# Patient Record
Sex: FEMALE | Race: BLACK OR AFRICAN AMERICAN | Employment: FULL TIME | URBAN - METROPOLITAN AREA
[De-identification: names, ages, dates, MRNs, and addresses within clinical notes are randomized per-mention and may not be internally consistent; named-entity substitution may affect disease eponyms.]

---

## 2018-10-01 PROBLEM — D50.9 MICROCYTIC ANEMIA: Status: ACTIVE | Noted: 2018-10-01

## 2018-10-01 PROBLEM — E87.6 HYPOKALEMIA: Status: ACTIVE | Noted: 2018-10-01

## 2018-10-03 ENCOUNTER — OFFICE VISIT (OUTPATIENT)
Dept: FAMILY MEDICINE CLINIC | Age: 38
End: 2018-10-03

## 2018-10-03 VITALS
OXYGEN SATURATION: 100 % | RESPIRATION RATE: 18 BRPM | HEIGHT: 63 IN | HEART RATE: 88 BPM | DIASTOLIC BLOOD PRESSURE: 97 MMHG | TEMPERATURE: 97.6 F | SYSTOLIC BLOOD PRESSURE: 147 MMHG | WEIGHT: 149 LBS | BODY MASS INDEX: 26.4 KG/M2

## 2018-10-03 DIAGNOSIS — D17.22 LIPOMA OF LEFT SHOULDER: ICD-10-CM

## 2018-10-03 DIAGNOSIS — Z20.2 EXPOSURE TO STD: ICD-10-CM

## 2018-10-03 DIAGNOSIS — N89.8 VAGINAL DISCHARGE: Primary | ICD-10-CM

## 2018-10-03 PROBLEM — D57.3 SICKLE-CELL TRAIT (HCC): Status: ACTIVE | Noted: 2018-06-19

## 2018-10-03 PROBLEM — Z17.0 MALIGNANT NEOPLASM OF UPPER-OUTER QUADRANT OF RIGHT BREAST IN FEMALE, ESTROGEN RECEPTOR POSITIVE (HCC): Status: ACTIVE | Noted: 2018-06-06

## 2018-10-03 PROBLEM — C50.411 MALIGNANT NEOPLASM OF UPPER-OUTER QUADRANT OF RIGHT BREAST IN FEMALE, ESTROGEN RECEPTOR POSITIVE (HCC): Status: ACTIVE | Noted: 2018-06-06

## 2018-10-03 LAB
BILIRUB UR QL STRIP: NEGATIVE
GLUCOSE UR-MCNC: NEGATIVE MG/DL
KETONES P FAST UR STRIP-MCNC: NEGATIVE MG/DL
PH UR STRIP: 7.5 [PH] (ref 4.6–8)
PROT UR QL STRIP: NORMAL
SP GR UR STRIP: 1.02 (ref 1–1.03)
UA UROBILINOGEN AMB POC: NORMAL (ref 0.2–1)
URINALYSIS CLARITY POC: CLEAR
URINALYSIS COLOR POC: YELLOW
URINE BLOOD POC: NORMAL
URINE LEUKOCYTES POC: NEGATIVE
URINE NITRITES POC: NEGATIVE

## 2018-10-03 RX ORDER — ONDANSETRON 8 MG/1
TABLET, ORALLY DISINTEGRATING ORAL
Refills: 3 | COMMUNITY
Start: 2018-08-17 | End: 2020-01-27 | Stop reason: ALTCHOICE

## 2018-10-03 RX ORDER — DEXAMETHASONE 4 MG/1
TABLET ORAL
COMMUNITY
Start: 2018-10-02 | End: 2018-11-12 | Stop reason: ALTCHOICE

## 2018-10-03 NOTE — MR AVS SNAPSHOT
[address]  [address] [facility] [address] [phone] Patient: Anny Patrick MRN: [id number] Visit Information Date & Time Provider Department Dept. Phone Encounter #  
 10/3/2018  9:00 AM Evangelina Meckel, [employer] [phone] [id number] Follow-up Instructions Return if symptoms worsen or fail to improve. Upcoming Health Maintenance Date Due DTaP/Tdap/Td series (1 - Tdap) 7/5/2003 PAP AKA CERVICAL CYTOLOGY 7/5/2003 Influenza Age 5 to Adult 8/1/2018 Allergies as of 10/3/2018  Review Complete On: 10/3/2018 By: Evangelina Meckel, NP No Known Allergies Current Immunizations  Never Reviewed No immunizations on file. Not reviewed this visit You Were Diagnosed With   
  
 Codes Comments Vaginal discharge    -  Primary ICD-10-CM: N89.8 ICD-9-CM: 623.5 Lipoma of left shoulder     ICD-10-CM: D17.22 
ICD-9-CM: 214.8 Exposure to STD     ICD-10-CM: Z20.2 ICD-9-CM: V01.6 Vitals BP Pulse Temp Resp Height(growth percentile) Weight(growth percentile) (!) 147/97 (BP 1 Location: Right arm, BP Patient Position: Sitting) 88 97.6 °F (36.4 °C) (Oral) 18 5' 3\" (1.6 m) 149 lb (67.6 kg) LMP SpO2 BMI OB Status Smoking Status 08/20/2018 100% 26.39 kg/m2 Unknown Never Smoker BMI and BSA Data Body Mass Index Body Surface Area  
 26.39 kg/m 2 1.73 m 2 Preferred Pharmacy Pharmacy Name Phone [facility] [address] [phone] Your Updated Medication List  
  
   
This list is accurate as of 10/3/18  9:49 AM.  Always use your most recent med list.  
  
  
  
  
 CARBOPLATIN IV  
by IntraVENous route. dexamethasone 4 mg tablet Commonly known as:  DECADRON  
  
 DOCETAXEL IV  
by IntraVENous route. ondansetron 8 mg disintegrating tablet Commonly known as:  ZOFRAN ODT  
DISSOLVE 1 TABLET ORALLY EVERY 8 HOURS AS NEEDED FOR NAUSEA. PERTUZUMAB IV  
by IntraVENous route. TRASTUZUMAB IV  
by IntraVENous route. We Performed the Following AMB POC URINALYSIS DIP STICK AUTO W/O MICRO [99098 CPT(R)] Follow-up Instructions Return if symptoms worsen or fail to improve. To-Do List   
 10/03/2018 Lab:  HIV 1/2 AG/AB, 4TH GENERATION,W RFLX CONFIRM   
  
 10/03/2018 Lab:  NUSWAB VAGINITIS PLUS   
  
 10/03/2018 Lab:  T PALLIDUM AB, BY FTA-ABS Patient Instructions Exposure to Sexually Transmitted Infections: Care Instructions Your Care Instructions Sexually transmitted infections (STIs) are those diseases spread by sexual contact. There are at least 20 different STIs, including chlamydia, gonorrhea, syphilis, and human immunodeficiency virus (HIV), which causes AIDS. Bacteria-caused STIs can be treated and cured. STIs caused by viruses, such as HIV, can be treated but not cured. Some STIs can reduce a woman's chances of getting pregnant in the future. STIs are spread during sexual contact, such as vaginal intercourse and oral or anal sex. Follow-up care is a key part of your treatment and safety. Be sure to make and go to all appointments, and call your doctor if you are having problems. It's also a good idea to know your test results and keep a list of the medicines you take. How can you care for yourself at home? · Your doctor may have given you a shot of antibiotics. If your doctor prescribed antibiotic pills, take them as directed. Do not stop taking them just because you feel better. You need to take the full course of antibiotics. · Do not have sexual contact while you have symptoms of an STI or are being treated for an STI. · Tell your sex partner (or partners) that he or she will need treatment. · If you are a woman, do not douche.  Douching changes the normal balance of bacteria in the vagina and may spread an infection up into your reproductive organs. To prevent exposure to STIs in the future · Use latex condoms every time you have sex. Use them from the beginning to the end of sexual contact. · Talk to your partner before you have sex. Find out if he or she has or is at risk for any STI. Keep in mind that a person may be able to spread an STI even if he or she does not have symptoms. · Do not have sex if you are being treated for an STI. · Do not have sex with anyone who has symptoms of an STI, such as sores on the genitals or mouth. · Having one sex partner (who does not have STIs and does not have sex with anyone else) is a good way to avoid STIs. When should you call for help? Call your doctor now or seek immediate medical care if: 
  · You have new pain in your belly or pelvis.  
  · You have symptoms of a urinary tract infection. These may include: 
¨ Pain or burning when you urinate. ¨ A frequent need to urinate without being able to pass much urine. ¨ Pain in the flank, which is just below the rib cage and above the waist on either side of the back. ¨ Blood in your urine. ¨ A fever.  
  · You have new or worsening pain or swelling in the scrotum.  
 Watch closely for changes in your health, and be sure to contact your doctor if: 
  · You have unusual vaginal bleeding.  
  · You have a discharge from the vagina or penis.  
  · You have any new symptoms, such as sores, bumps, rashes, blisters, or warts.  
  · You have itching, tingling, pain, or burning in the genital or anal area.  
  · You think you may have an STI. Where can you learn more? Go to http://nomi-dexter.info/. Enter O990 in the search box to learn more about \"Exposure to Sexually Transmitted Infections: Care Instructions. \" Current as of: November 27, 2017 Content Version: 11.7 © 2537-1869 [employer].  Care instructions adapted under license by [address] (which disclaims liability or warranty for this information). If you have questions about a medical condition or this instruction, always ask your healthcare professional. [address] any warranty or liability for your use of this information. Introducing [facility]! [facility] introduces MENABANQER patient portal. Now you can access parts of your medical record, email your doctor's office, and request medication refills online. 1. In your internet browser, go to [email or web address] 2. Click on the First Time User? Click Here link in the Sign In box. You will see the New Member Sign Up page. 3. Enter your MENABANQER Access Code exactly as it appears below. You will not need to use this code after youve completed the sign-up process. If you do not sign up before the expiration date, you must request a new code. · MENABANQER Access Code: [email or web address] Expires: 1/1/2019  9:49 AM 
 
4. Enter the last four digits of your Social Security Number (xxxx) and Date of Birth (mm/dd/yyyy) as indicated and click Submit. You will be taken to the next sign-up page. 5. Create a MENABANQER ID. This will be your MENABANQER login ID and cannot be changed, so think of one that is secure and easy to remember. 6. Create a MENABANQER password. You can change your password at any time. 7. Enter your Password Reset Question and Answer. This can be used at a later time if you forget your password. 8. Enter your e-mail address. You will receive e-mail notification when new information is available in [address] 9. Click Sign Up. You can now view and download portions of your medical record. 10. Click the Download Summary menu link to download a portable copy of your medical information. If you have questions, please visit the Frequently Asked Questions section of the MENABANQER website.  Remember, MENABANQER is NOT to be used for urgent needs. For medical emergencies, dial 911. Now available from your iPhone and Android! Please provide this summary of care documentation to your next provider. Your primary care clinician is listed as Felicia Dixon. If you have any questions after today's visit, please call [phone].

## 2018-10-03 NOTE — PROGRESS NOTES
Freedom Doty is a 41 y.o. female (: 1980) presenting to address:vaginal discharge x2 weeks     Chief Complaint   Patient presents with    Vaginal Discharge     x2 weeks        Vitals:    10/03/18 0907   BP: (!) 147/97   Pulse: 88   Resp: 18   Temp: 97.6 °F (36.4 °C)   TempSrc: Oral   SpO2: 100%   Weight: 149 lb (67.6 kg)   Height: 5' 3\" (1.6 m)   PainSc:   0 - No pain   LMP: 2018       Hearing/Vision:   No exam data present    Learning Assessment:   No flowsheet data found. Depression Screening:     PHQ over the last two weeks 10/3/2018   Little interest or pleasure in doing things Not at all   Feeling down, depressed, irritable, or hopeless More than half the days   Total Score PHQ 2 2     Fall Risk Assessment:   No flowsheet data found. Abuse Screening:   No flowsheet data found. Coordination of Care Questionaire:   1. Have you been to the ER, urgent care clinic since your last visit? Hospitalized since your last visit? no    2. Have you seen or consulted any other health care providers outside of the [facility] since your last visit? Include any pap smears or colon screening. Oncology Dr. Reyes Proper     Advanced Directive:   1. Do you have an Advanced Directive? no    2. Would you like information on Advanced Directives?  no

## 2018-10-03 NOTE — PROGRESS NOTES
Subjective:    Melanialarry [age] y.o. female complains of copious, foul and green vaginal discharge for 2 weeks. .  Denies abnormal vaginal bleeding or significant pelvic pain or  fever. No UTI symptoms. Denies history of known exposure to STD. Patient's last menstrual period was 08/20/2018. Other c/o lipoma left shoulder has been there for many years does not bother her. She would like to know if it can be removed at some point. Objective:   She appears well, afebrile. Abdomen: benign, soft, nontender, no masses. Pelvic Exam: normal external genitalia, vulva, vagina, cervix, uterus and adnexa. Urine dipstick:  positive for RBC's.  pending culture. For lipoma referral to surgeon when stable and healthy again if it is not bothersome no need to worry about it now. Assessment/Plan:   Pending labs and nuswab    ICD-10-CM ICD-9-CM    1. Vaginal discharge N89.8 623.5 AMB POC URINALYSIS DIP STICK AUTO W/O MICRO      NUSWAB VAGINITIS PLUS   2. Lipoma of left shoulder D17.22 214.8    3. Exposure to STD Z20.2 V01.6 T PALLIDUM AB, BY FTA-ABS      HIV 1/2 AG/AB, 4TH GENERATION,W RFLX CONFIRM      NUSWAB VAGINITIS PLUS       Orders Placed This Encounter    T PALLIDUM AB, BY FTA-ABS    HIV 1/2 AG/AB, 4TH GENERATION,W RFLX CONFIRM    NUSWAB VAGINITIS PLUS    AMB POC URINALYSIS DIP STICK AUTO W/O MICRO    dexamethasone (DECADRON) 4 mg tablet    ondansetron (ZOFRAN ODT) 8 mg disintegrating tablet    DOCETAXEL IV    CARBOPLATIN IV    TRASTUZUMAB IV    PERTUZUMAB IV   .

## 2018-10-03 NOTE — PATIENT INSTRUCTIONS
Exposure to Sexually Transmitted Infections: Care Instructions  Your Care Instructions  Sexually transmitted infections (STIs) are those diseases spread by sexual contact. There are at least 20 different STIs, including chlamydia, gonorrhea, syphilis, and human immunodeficiency virus (HIV), which causes AIDS. Bacteria-caused STIs can be treated and cured. STIs caused by viruses, such as HIV, can be treated but not cured. Some STIs can reduce a woman's chances of getting pregnant in the future. STIs are spread during sexual contact, such as vaginal intercourse and oral or anal sex. Follow-up care is a key part of your treatment and safety. Be sure to make and go to all appointments, and call your doctor if you are having problems. It's also a good idea to know your test results and keep a list of the medicines you take. How can you care for yourself at home? · Your doctor may have given you a shot of antibiotics. If your doctor prescribed antibiotic pills, take them as directed. Do not stop taking them just because you feel better. You need to take the full course of antibiotics. · Do not have sexual contact while you have symptoms of an STI or are being treated for an STI. · Tell your sex partner (or partners) that he or she will need treatment. · If you are a woman, do not douche. Douching changes the normal balance of bacteria in the vagina and may spread an infection up into your reproductive organs. To prevent exposure to STIs in the future  · Use latex condoms every time you have sex. Use them from the beginning to the end of sexual contact. · Talk to your partner before you have sex. Find out if he or she has or is at risk for any STI. Keep in mind that a person may be able to spread an STI even if he or she does not have symptoms. · Do not have sex if you are being treated for an STI. · Do not have sex with anyone who has symptoms of an STI, such as sores on the genitals or mouth.   · Having one sex partner (who does not have STIs and does not have sex with anyone else) is a good way to avoid STIs. When should you call for help? Call your doctor now or seek immediate medical care if:    · You have new pain in your belly or pelvis.     · You have symptoms of a urinary tract infection. These may include:  ¨ Pain or burning when you urinate. ¨ A frequent need to urinate without being able to pass much urine. ¨ Pain in the flank, which is just below the rib cage and above the waist on either side of the back. ¨ Blood in your urine. ¨ A fever.     · You have new or worsening pain or swelling in the scrotum.    Watch closely for changes in your health, and be sure to contact your doctor if:    · You have unusual vaginal bleeding.     · You have a discharge from the vagina or penis.     · You have any new symptoms, such as sores, bumps, rashes, blisters, or warts.     · You have itching, tingling, pain, or burning in the genital or anal area.     · You think you may have an STI. Where can you learn more? Go to http://nomi-dexter.info/. Enter D405 in the search box to learn more about \"Exposure to Sexually Transmitted Infections: Care Instructions. \"  Current as of: November 27, 2017  Content Version: 11.7  © 4243-4056 [employer]. Care instructions adapted under license by [employer] (which disclaims liability or warranty for this information). If you have questions about a medical condition or this instruction, always ask your healthcare professional. [place] [address] any warranty or liability for your use of this information.

## 2018-10-05 LAB
HIV 1+2 AB+HIV1 P24 AG SERPL QL IA: NON REACTIVE
T PALLIDUM AB SER QL IF: NON REACTIVE

## 2018-10-09 LAB
A VAGINAE DNA VAG QL NAA+PROBE: NORMAL SCORE
BACTERIA UR CULT: ABNORMAL
BVAB2 DNA VAG QL NAA+PROBE: NORMAL SCORE
C ALBICANS DNA VAG QL NAA+PROBE: NEGATIVE
C GLABRATA DNA VAG QL NAA+PROBE: NEGATIVE
C TRACH RRNA SPEC QL NAA+PROBE: NEGATIVE
MEGA1 DNA VAG QL NAA+PROBE: NORMAL SCORE
N GONORRHOEA RRNA SPEC QL NAA+PROBE: NEGATIVE
T VAGINALIS RRNA SPEC QL NAA+PROBE: NEGATIVE

## 2018-10-09 RX ORDER — METRONIDAZOLE 500 MG/1
500 TABLET ORAL 2 TIMES DAILY
Qty: 14 TAB | Refills: 0 | Status: SHIPPED | OUTPATIENT
Start: 2018-10-09 | End: 2018-10-16

## 2018-10-09 NOTE — PROGRESS NOTES
Please call the patient regarding her abnormal result. It does show abnormal for BV low grade start flagyl this is the medicine we talked about in the office that is ok. Attempted to contact patient. LM to CB.  Jannet Prabhakar

## 2018-10-10 NOTE — PROGRESS NOTES
Patient advised but stated that she would like to wait until after her next chemo tx to take (10/29) and wanted to know if this was ok? Patient also asking for results of lab results.  Jannet Prabhakar

## 2018-10-11 ENCOUNTER — TELEPHONE (OUTPATIENT)
Dept: FAMILY MEDICINE CLINIC | Age: 38
End: 2018-10-11

## 2018-10-15 ENCOUNTER — HOSPITAL ENCOUNTER (OUTPATIENT)
Dept: NUTRITION | Age: 38
Discharge: HOME | End: 2018-10-15
Payer: MEDICAID

## 2018-10-15 PROCEDURE — 97802 MEDICAL NUTRITION INDIV IN: CPT

## 2018-10-15 NOTE — PROGRESS NOTES
[address] [place] [address] 51, 45 [facility], [facility], [address] [facility]  Phone: [phone]  Fax: [phone]   Nutrition Assessment - Medical Nutrition Therapy   Outpatient Initial Evaluation         Patient Name: Concepcion Davis : 1980   Treatment Diagnosis: Breast CA   Referral Source: Jam Rivas MD Start of Care [facility]): 10/15/2018     Gender: female Age: 41 y.o. 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 She lives with her dad and stepmother. She is currently on chemo and she has 1 tx left (beginning Oct 29th). She has been eating healthier since living with dad, but she still has aversions to specific foods because she is scared to eat many things. Pt also suffers from anemia, decreased appetite, and IBD (chemo induced). At times, she cannot taste food. Her dad is encouraging her to utilize stationary bike for healthy lifestyle (diet + exercise). Current Eating Patterns: Pt lives with dad and stepmom. Dad mainly shops and cooks, pt cooks at times. Dad cooks 7 days per week.    B: 1-2 eggs, or [place] yogurt with wheat bread (ate wheat bread d/t constipation), Ensure, corn and wheat based cereals  L: Esnure, applesauce, canned peaches, bananas, maybe sandwich (tuna fish), cottage cheese  D: baked fish or chicken,        Estimate Needs   Calories:  - Protein: - Carbs: - Fat: -   Kcal/day  g/day  g/day  g/day        percent:                    Education & Recommendations provided: Educated pt on healthy diet for CA (things to avoid, ways to cook certain foods). Encouraged pt to eat small meals t/o the day so EEN is met or close to being met with decreased appetite. Developed a rough meal plan with patient and provided meal/snack ideas. Provided pt with information on estrogenic foods (pt request) and encouraged increased fruit/vegetable and water consumption. Recommended fiber supplement r/t c/o constipation. Handouts Provided: [x]  Carbohydrates  []  Protein  []  Fiber  []  Serving Sizes  []  Meal and Snack Ideas  []  Food Journals []  Diabetes  []  Cholesterol  []  Sodium  []  Gen Nutr Guidelines  []  SBGM Guidelines  [x]  Others: Snacks, rough meal plan (breakfast), and pamphlet on estrogenic foods   Information Reviewed with: Pt and father   Readiness to Change Stage: []  Pre-contemplative    []  Contemplative  []  Preparation               [x]  Action                  [x]  Maintenance   Potential Barriers to Learning: []  Decline in memory    []  Language barrier   []  Other:  []  Emotional                  []  Limited mobility  []  Lack of motivation     [] Vision, hearing or cognitive impairment   Expected Compliance: Good due to severity of dx. Nutritional Goal - To promote lifestyle changes to result in:    []  Weight loss  []  Improved diabetic control  []  Decreased cholesterol levels  []  Decreased blood pressure  []  Weight maintenance []  Preventing any interactions associated with food allergies  []  Adequate weight gain toward goal weight  [x]  Other: Eating healthy for CA support       Patient Goals:  SMART goals 1. At least 5 servings of fruits and vegetables per day  2. Look into yoga for beginners (to help with stress and digestion)  3.  No bottled water or soy products     Dietitian Signature: Koko Mark MS, RD Date: 10/15/2018   Follow-up: Wed 10/24/18 @ 2:30 Time: 10:13 AM

## 2018-10-19 ENCOUNTER — OFFICE VISIT (OUTPATIENT)
Dept: FAMILY MEDICINE CLINIC | Age: 38
End: 2018-10-19

## 2018-10-19 VITALS
SYSTOLIC BLOOD PRESSURE: 132 MMHG | RESPIRATION RATE: 18 BRPM | WEIGHT: 146 LBS | OXYGEN SATURATION: 100 % | HEIGHT: 63 IN | HEART RATE: 99 BPM | TEMPERATURE: 98 F | BODY MASS INDEX: 25.87 KG/M2 | DIASTOLIC BLOOD PRESSURE: 88 MMHG

## 2018-10-19 DIAGNOSIS — R30.9 URINARY PAIN: Primary | ICD-10-CM

## 2018-10-19 DIAGNOSIS — R31.9 HEMATURIA, UNSPECIFIED TYPE: ICD-10-CM

## 2018-10-19 LAB
BILIRUB UR QL STRIP: NEGATIVE
GLUCOSE UR-MCNC: NEGATIVE MG/DL
KETONES P FAST UR STRIP-MCNC: NORMAL MG/DL
PH UR STRIP: 7.5 [PH] (ref 4.6–8)
PROT UR QL STRIP: NORMAL
SP GR UR STRIP: 1.02 (ref 1–1.03)
UA UROBILINOGEN AMB POC: NORMAL (ref 0.2–1)
URINALYSIS CLARITY POC: CLEAR
URINALYSIS COLOR POC: YELLOW
URINE BLOOD POC: NORMAL
URINE LEUKOCYTES POC: NEGATIVE
URINE NITRITES POC: NEGATIVE

## 2018-10-19 NOTE — PROGRESS NOTES
Subjective:     Karol Blandon is a 41 y.o. female who complains of suprapubic pressure for several days. Patient denies back pain, fever, stomach ache and vaginal discharge. Patient does not have a history of recurrent UTI. Patient does not have a history of pyelonephritis. Past Medical History:   Diagnosis Date    Anemia     Breast cancer ([facility] [address]     Stage 1 Right breast     Hypokalemia 10/1/2018    Microcytic anemia 10/1/2018    Potassium (K) deficiency      Family History   Problem Relation Age of Onset    No Known Problems Mother     No Known Problems Father      Current Outpatient Medications   Medication Sig Dispense Refill    dexamethasone (DECADRON) 4 mg tablet       ondansetron (ZOFRAN ODT) 8 mg disintegrating tablet DISSOLVE 1 TABLET ORALLY EVERY 8 HOURS AS NEEDED FOR NAUSEA. 3    DOCETAXEL IV by IntraVENous route.  CARBOPLATIN IV by IntraVENous route.  TRASTUZUMAB IV by IntraVENous route.  PERTUZUMAB IV by IntraVENous route.  codeine-guaiFENesin (ROBITUSSIN-AC)  mg/5 mL syrup Take 5-10 mL by mouth three (3) times daily as needed for Cough or Congestion (CAUTION WILL CAUSE DROWSINESS).  120 mL 0     No Known Allergies  Social History     Socioeconomic History    Marital status:      Spouse name: Not on file    Number of children: Not on file    Years of education: Not on file    Highest education level: Not on file   Social Needs    Financial resource strain: Not on file    Food insecurity - worry: Not on file    Food insecurity - inability: Not on file    Transportation needs - medical: Not on file   PowWowHR needs - non-medical: Not on file   Occupational History    Not on file   Tobacco Use    Smoking status: Never Smoker    Smokeless tobacco: Never Used   Substance and Sexual Activity    Alcohol use: No    Drug use: No    Sexual activity: No   Other Topics Concern    Not on file   Social History Narrative    ** Merged History Encounter **          Review of Systems  Pertinent items are noted in HPI. Objective:     Visit Vitals  /88 (BP 1 Location: Right arm, BP Patient Position: Sitting)   Pulse 99   Temp 98 °F (36.7 °C) (Oral)   Resp 18   Ht 5' 3\" (1.6 m)   Wt 146 lb (66.2 kg)   SpO2 100%   BMI 25.86 kg/m²     General: alert, cooperative, no distress, appears stated age   Abdomen: soft, non-tender, without masses or organomegaly in the entire abdomen   Back: CVA tenderness absent   : no lesions, no discharge, no CMT, no adnexal masses B   Rectal: deferred     Laboratory:   Urine dipstick shows abnormal's charted. Assessment:     Hematuria   Urinary pain       Plan:     1. discussed hematuria could be in relation to chemo repeat after last tx  2. Maintain adequate hydration  3. Follow up if symptoms not improving, and prn.

## 2018-10-19 NOTE — PROGRESS NOTES
Parker Jauregui is a 41 y.o. female (: 1980) presenting to address:urinary pain     Chief Complaint   Patient presents with    Urinary Pain     f/u        Vitals:    10/19/18 1032   BP: 132/88   Pulse: 99   Resp: 18   Temp: 98 °F (36.7 °C)   TempSrc: Oral   SpO2: 100%   Weight: 146 lb (66.2 kg)   Height: 5' 3\" (1.6 m)   PainSc:   0 - No pain       Hearing/Vision:   No exam data present    Learning Assessment:   No flowsheet data found. Depression Screening:     PHQ over the last two weeks 10/3/2018   Little interest or pleasure in doing things Not at all   Feeling down, depressed, irritable, or hopeless More than half the days   Total Score PHQ 2 2     Fall Risk Assessment:   No flowsheet data found. Abuse Screening:   No flowsheet data found. Coordination of Care Questionaire:   1. Have you been to the ER, urgent care clinic since your last visit? Hospitalized since your last visit? no    2. Have you seen or consulted any other health care providers outside of the [address] [place] [address] since your last visit? Include any pap smears or colon screening. no    Advanced Directive:   1. Do you have an Advanced Directive? no    2. Would you like information on Advanced Directives?  no

## 2018-10-19 NOTE — PATIENT INSTRUCTIONS
Blood in the Urine: Care Instructions  Your Care Instructions    Blood in the urine, or hematuria, may make the urine look red, brown, or pink. There may be blood every time you urinate or just from time to time. You cannot always see blood in the urine, but it will show up in a urine test.  Blood in the urine may be serious. It should always be checked by a doctor. Your doctor may recommend more tests, including an X-ray, a CT scan, or a cystoscopy (which lets a doctor look inside the urethra and bladder). Blood in the urine can be a sign of another problem. Common causes are bladder infections and kidney stones. An injury to your groin or your genital area can also cause bleeding in the urinary tract. Very hard exercise--such as running a marathon--can cause blood in the urine. Blood in the urine can also be a sign of kidney disease or cancer in the bladder or kidney. Many cases of blood in the urine are caused by a harmless condition that runs in families. This is called benign familial hematuria. It does not need any treatment. Sometimes your urine may look red or brown even though it does not contain blood. For example, not getting enough fluids (dehydration), taking certain medicines, or having a liver problem can change the color of your urine. Eating foods such as beets, rhubarb, or blackberries or foods with red food coloring can make your urine look red or pink. Follow-up care is a key part of your treatment and safety. Be sure to make and go to all appointments, and call your doctor if you are having problems. It's also a good idea to know your test results and keep a list of the medicines you take. When should you call for help? Call your doctor now or seek immediate medical care if:    · You have symptoms of a urinary infection. For example:  ? You have pus in your urine. ? You have pain in your back just below your rib cage. This is called flank pain. ?  You have a fever, chills, or body aches.  ? It hurts to urinate. ? You have groin or belly pain.     · You have more blood in your urine.    Watch closely for changes in your health, and be sure to contact your doctor if:    · You have new urination problems.     · You do not get better as expected. Where can you learn more? Go to http://nomi-dexter.info/. Enter A070 in the search box to learn more about \"Blood in the Urine: Care Instructions. \"  Current as of: March 21, 2018  Content Version: 11.8  © 3972-6153 [employer]. Care instructions adapted under license by [employer] (which disclaims liability or warranty for this information). If you have questions about a medical condition or this instruction, always ask your healthcare professional. [address] any warranty or liability for your use of this information.

## 2018-10-24 ENCOUNTER — HOSPITAL ENCOUNTER (OUTPATIENT)
Dept: NUTRITION | Age: 38
Discharge: HOME | End: 2018-10-24
Payer: MEDICAID

## 2018-10-24 PROCEDURE — 97803 MED NUTRITION INDIV SUBSEQ: CPT

## 2018-11-12 ENCOUNTER — OFFICE VISIT (OUTPATIENT)
Dept: FAMILY MEDICINE CLINIC | Age: 38
End: 2018-11-12

## 2018-11-12 VITALS
HEART RATE: 94 BPM | OXYGEN SATURATION: 99 % | TEMPERATURE: 98.3 F | WEIGHT: 150.6 LBS | HEIGHT: 63 IN | RESPIRATION RATE: 16 BRPM | DIASTOLIC BLOOD PRESSURE: 94 MMHG | SYSTOLIC BLOOD PRESSURE: 138 MMHG | BODY MASS INDEX: 26.68 KG/M2

## 2018-11-12 DIAGNOSIS — L30.9 FACIAL DERMATITIS: Primary | ICD-10-CM

## 2018-11-12 RX ORDER — DESONIDE 0.5 MG/G
CREAM TOPICAL 2 TIMES DAILY
Qty: 15 G | Refills: 1 | Status: SHIPPED | OUTPATIENT
Start: 2018-11-12 | End: 2018-11-16 | Stop reason: CLARIF

## 2018-11-12 NOTE — PROGRESS NOTES
Rick Delgado is a 41 y.o. female (: 1980) presenting to address: Chief Complaint Patient presents with  Rash Here for rash to right eye. Pt declined flu vaccine. There were no vitals filed for this visit. Hearing/Vision: No exam data present Learning Assessment:  
No flowsheet data found. Depression Screening: PHQ over the last two weeks 2018 Little interest or pleasure in doing things Several days Feeling down, depressed, irritable, or hopeless Several days Total Score PHQ 2 2 Fall Risk Assessment:  
No flowsheet data found. Abuse Screening: No flowsheet data found. Coordination of Care Questionaire: 1. Have you been to the ER, urgent care clinic since your last visit? Hospitalized since your last visit? YES, [facility] 11/10/18 2. Have you seen or consulted any other health care providers outside of the [address] [place] [address] since your last visit? Include any pap smears or colon screening. NO Advanced Directive: 1. Do you have an Advanced Directive? NO 
 
2. Would you like information on Advanced Directives?  NO

## 2018-11-12 NOTE — PROGRESS NOTES
HISTORY OF PRESENT ILLNESS Steve Madsen is a 41 y.o. female. Rash The history is provided by the patient and medical records. This is a new problem. Episode onset: about 5 days. Review of Systems Constitutional: Positive for fever. Negative for chills. Cardiovascular: Negative for chest pain and palpitations. Skin: Positive for rash (itchy bumps under the eyes R>L, seem to be getting a little better-no change in personal care items, currently receiving chemotherapy for breast CA). Visit Vitals BP (!) 138/94 (BP 1 Location: Right arm, BP Patient Position: Sitting) Pulse 94 Temp 98.3 °F (36.8 °C) (Oral) Resp 16 Ht 5' 3\" (1.6 m) Wt 150 lb 9.6 oz (68.3 kg) SpO2 99% BMI 26.68 kg/m² Physical Exam  
Constitutional: She is oriented to person, place, and time. She appears well-developed and well-nourished. HENT:  
Head: Normocephalic. Right Ear: Tympanic membrane and ear canal normal.  
Left Ear: Tympanic membrane and ear canal normal.  
Mouth/Throat: Oropharynx is clear and moist.  
Eyes: Conjunctivae and EOM are normal.  
Neck: Neck supple. Cardiovascular: Normal rate, regular rhythm and normal heart sounds. Pulmonary/Chest: Effort normal and breath sounds normal.  
Lymphadenopathy:  
  She has no cervical adenopathy. Neurological: She is alert and oriented to person, place, and time. Skin: Skin is warm and dry. Rash (scattered tiny white papules, below the eyes, above the zygomatic arches, R>L) noted. Psychiatric: She has a normal mood and affect. Her behavior is normal.  
Nursing note and vitals reviewed. ASSESSMENT and PLAN 
  ICD-10-CM ICD-9-CM 1. Facial dermatitis L30.9 692.9 [place], apply sparingly up to twice daily Follow up for new symptoms, worsening symptoms or failure to improve.

## 2018-11-12 NOTE — PATIENT INSTRUCTIONS
[place], apply sparingly up to twice daily Follow up for new symptoms, worsening symptoms or failure to improve. Dermatitis: Care Instructions Your Care Instructions Dermatitis is the general name used for any rash or inflammation of the skin. Different kinds of dermatitis cause different kinds of rashes. Common causes of a rash include new medicines, plants (such as poison oak or poison ivy), heat, and stress. Certain illnesses can also cause a rash. An allergic reaction to something that touches your skin, such as latex, nickel, or poison ivy, is called contact dermatitis. Contact dermatitis may also be caused by something that irritates the skin, such as bleach, a chemical, or soap. These types of rashes cannot be spread from person to person. How long your rash will last depends on what caused it. Rashes may last a few days or months. Follow-up care is a key part of your treatment and safety. Be sure to make and go to all appointments, and call your doctor if you are having problems. It's also a good idea to know your test results and keep a list of the medicines you take. How can you care for yourself at home? · Do not scratch the rash. Cut your nails short, and file them smooth. Or wear gloves if this helps keep you from scratching. · Wash the area with water only. Pat dry. · Put cold, wet cloths on the rash to reduce itching. · Keep cool, and stay out of the sun. · Leave the rash open to the air as much as possible. · If the rash itches, use hydrocortisone cream. Follow the directions on the label. Calamine lotion may help for plant rashes. · Take an over-the-counter antihistamine, such as diphenhydramine (Benadryl) or loratadine (Claritin), to help calm the itching. Read and follow all instructions on the label. · If your doctor prescribed a cream, use it as directed. If your doctor prescribed medicine, take it exactly as directed. When should you call for help? Call your doctor now or seek immediate medical care if: 
  · You have symptoms of infection, such as: 
? Increased pain, swelling, warmth, or redness. ? Red streaks leading from the area. ? Pus draining from the area. ? A fever.  
  · You have joint pain along with the rash.  
 Watch closely for changes in your health, and be sure to contact your doctor if: 
  · Your rash is changing or getting worse.  
  · You are not getting better as expected. Where can you learn more? Go to http://nomi-dexter.info/. Enter [phone] in the search box to learn more about \"Dermatitis: Care Instructions. \" Current as of: April 18, 2018 Content Version: 11.8 © 4704-7814 [employer] Inc[employer]. Care instructions adapted under license by [employer] (which disclaims liability or warranty for this information). If you have questions about a medical condition or this instruction, always ask your healthcare professional. [place] [address] any warranty or liability for your use of this information.

## 2018-11-14 ENCOUNTER — APPOINTMENT (OUTPATIENT)
Dept: NUTRITION | Age: 38
End: 2018-11-14

## 2018-11-16 ENCOUNTER — TELEPHONE (OUTPATIENT)
Dept: FAMILY MEDICINE CLINIC | Age: 38
End: 2018-11-16

## 2018-11-16 DIAGNOSIS — L30.9 FACIAL DERMATITIS: Primary | ICD-10-CM

## 2018-11-16 RX ORDER — BETAMETHASONE VALERATE 1.2 MG/G
CREAM TOPICAL
Qty: 15 G | Refills: 1 | Status: SHIPPED | OUTPATIENT
Start: 2018-11-16 | End: 2020-01-27 | Stop reason: ALTCHOICE

## 2018-11-16 NOTE — TELEPHONE ENCOUNTER
Insurance doesn't cover [facility] please try betamethasone valerate or  triamcinolone acetonide.

## 2019-02-06 ENCOUNTER — OFFICE VISIT (OUTPATIENT)
Dept: FAMILY MEDICINE CLINIC | Age: 39
End: 2019-02-06

## 2019-02-06 VITALS
WEIGHT: 162 LBS | TEMPERATURE: 98.1 F | RESPIRATION RATE: 20 BRPM | BODY MASS INDEX: 28.7 KG/M2 | DIASTOLIC BLOOD PRESSURE: 71 MMHG | HEIGHT: 63 IN | OXYGEN SATURATION: 100 % | SYSTOLIC BLOOD PRESSURE: 118 MMHG | HEART RATE: 76 BPM

## 2019-02-06 DIAGNOSIS — Z11.3 SCREENING FOR STD (SEXUALLY TRANSMITTED DISEASE): ICD-10-CM

## 2019-02-06 DIAGNOSIS — Z01.419 WELL WOMAN EXAM WITH ROUTINE GYNECOLOGICAL EXAM: Primary | ICD-10-CM

## 2019-02-06 NOTE — PROGRESS NOTES
Subjective:   41 y.o. female for Well Woman Check. Over due for pap, has hx of breast cancer currently being treated finished chemo and has had lumpectomy 2 weeks ago. Patient's last menstrual period was 07/21/2018. Social History: not sexually active. Pertinent past medical hstory: no history of HTN, DVT, CAD, DM, liver disease, migraines or smoking. Current Outpatient Medications   Medication Sig Dispense Refill    ondansetron (ZOFRAN ODT) 8 mg disintegrating tablet DISSOLVE 1 TABLET ORALLY EVERY 8 HOURS AS NEEDED FOR NAUSEA. 3    TRASTUZUMAB IV by IntraVENous route.  betamethasone valerate (VALISONE) 0.1 % topical cream Apply sparingly to affected areas twice daily, do not use for more than 14 days consecutively without a break 15 g 1    DOCETAXEL IV by IntraVENous route.  CARBOPLATIN IV by IntraVENous route.  PERTUZUMAB IV by IntraVENous route. No Known Allergies     ROS:  Feeling well. No dyspnea or chest pain on exertion. No abdominal pain, change in bowel habits, black or bloody stools. No urinary tract symptoms. GYN ROS: normal menses, no abnormal bleeding, pelvic pain or discharge, + breast cancer. No neurological complaints. Objective:     Visit Vitals  /71 (BP 1 Location: Left arm, BP Patient Position: Sitting)   Pulse 76   Temp 98.1 °F (36.7 °C) (Oral)   Resp 20   Ht 5' 3\" (1.6 m)   Wt 162 lb (73.5 kg)   LMP 07/21/2018   SpO2 100%   BMI 28.70 kg/m²     The patient appears well, alert, oriented x 3, in no distress. ENT normal.  Neck supple. No adenopathy or thyromegaly. HARI. Lungs are clear, good air entry, no wheezes, rhonchi or rales. S1 and S2 normal, no murmurs, regular rate and rhythm. Abdomen soft without tenderness, guarding, mass or organomegaly. Extremities show no edema, normal peripheral pulses. Neurological is normal, no focal findings. BREAST EXAM: breasts not examined current oncology patient s/p lumpectomy 2 weeks ago.     PELVIC EXAM: VULVA: normal appearing vulva with no masses, tenderness or lesions, VAGINA: normal appearing vagina with normal color and discharge, no lesions, CERVIX: normal appearing cervix without discharge or lesions, UTERUS: uterus is normal size, shape, consistency and nontender, ADNEXA: normal adnexa in size, nontender and no masses, RECTAL: normal rectal, no masses    Assessment/Plan:   well woman  pap smear  Continue with oncology for breast care s/p lumpectomy starting radiation next week. Nuswab pending  return annually or prn  Encounter Diagnoses   Name Primary?  Well woman exam with routine gynecological exam Yes    Screening for STD (sexually transmitted disease)      Orders Placed This Encounter    NUSWAB VAGINITIS PLUS    PAP + HPV DNA (HIGH RISK)   .

## 2019-02-06 NOTE — PROGRESS NOTES
Barbi Valdez is a 41 y.o. female (: 1980) presenting to address:    Chief Complaint   Patient presents with   Salome Dennise Exam     patient declined flu shot       Vitals:    19 1450   BP: 118/71   Pulse: 76   Resp: 20   Temp: 98.1 °F (36.7 °C)   TempSrc: Oral   SpO2: 100%   Weight: 162 lb (73.5 kg)   Height: 5' 3\" (1.6 m)   PainSc:   0 - No pain   LMP: 2018       Hearing/Vision:   No exam data present    Learning Assessment:   No flowsheet data found. Depression Screening:     PHQ over the last two weeks 2019   Little interest or pleasure in doing things Not at all   Feeling down, depressed, irritable, or hopeless Not at all   Total Score PHQ 2 0     Fall Risk Assessment:   No flowsheet data found. Abuse Screening:   No flowsheet data found. Coordination of Care Questionaire:   1. Have you been to the ER, urgent care clinic since your last visit? Hospitalized since your last visit? NO    2. Have you seen or consulted any other health care providers outside of the [address] [place] [address] since your last visit? Include any pap smears or colon screening. NO    Advanced Directive:   1. Do you have an Advanced Directive? NO    2. Would you like information on Advanced Directives?  NO

## 2019-02-06 NOTE — PATIENT INSTRUCTIONS

## 2019-02-08 LAB
CYTOLOGIST CVX/VAG CYTO: NORMAL
DX ICD CODE: NORMAL
HPV I/H RISK 1 DNA CVX QL PROBE+SIG AMP: NEGATIVE
Lab: NORMAL
OTHER STN SPEC: NORMAL
PATH REPORT.FINAL DX SPEC: NORMAL
STAT OF ADQ CVX/VAG CYTO-IMP: NORMAL

## 2019-02-10 LAB
A VAGINAE DNA VAG QL NAA+PROBE: ABNORMAL SCORE
BVAB2 DNA VAG QL NAA+PROBE: ABNORMAL SCORE
C ALBICANS DNA VAG QL NAA+PROBE: NEGATIVE
C GLABRATA DNA VAG QL NAA+PROBE: NEGATIVE
C TRACH RRNA SPEC QL NAA+PROBE: NEGATIVE
MEGA1 DNA VAG QL NAA+PROBE: ABNORMAL SCORE
N GONORRHOEA RRNA SPEC QL NAA+PROBE: NEGATIVE
SPECIMEN STATUS REPORT, ROLRST: NORMAL
T VAGINALIS RRNA SPEC QL NAA+PROBE: NEGATIVE

## 2019-02-14 ENCOUNTER — OFFICE VISIT (OUTPATIENT)
Dept: FAMILY MEDICINE CLINIC | Age: 39
End: 2019-02-14

## 2019-02-14 VITALS
SYSTOLIC BLOOD PRESSURE: 112 MMHG | TEMPERATURE: 98.8 F | HEART RATE: 80 BPM | WEIGHT: 157.6 LBS | OXYGEN SATURATION: 98 % | DIASTOLIC BLOOD PRESSURE: 79 MMHG | BODY MASS INDEX: 27.93 KG/M2 | RESPIRATION RATE: 16 BRPM | HEIGHT: 63 IN

## 2019-02-14 DIAGNOSIS — H10.9 BACTERIAL CONJUNCTIVITIS: Primary | ICD-10-CM

## 2019-02-14 RX ORDER — POLYMYXIN B SULFATE AND TRIMETHOPRIM 1; 10000 MG/ML; [USP'U]/ML
1 SOLUTION OPHTHALMIC EVERY 4 HOURS
Qty: 1 BOTTLE | Refills: 0 | Status: SHIPPED | OUTPATIENT
Start: 2019-02-14 | End: 2019-02-19

## 2019-02-14 NOTE — PROGRESS NOTES
Patient DECLINED flu vaccine. Jessica Sibley is a 41 y.o. female (: 1980) presenting to address: Chief Complaint Patient presents with  Blurred Vision  
  right eye Vitals:  
 19 1404 BP: 112/79 Pulse: 80 Resp: 16 Temp: 98.8 °F (37.1 °C) TempSrc: Oral  
SpO2: 98% Weight: 157 lb 9.6 oz (71.5 kg) Height: 5' 3\" (1.6 m) PainSc:   0 - No pain Hearing/Vision:  
 
 Visual Acuity Screening Right eye Left eye Both eyes Without correction: 20/20 20/30 20/20 With correction:     
 
 
Learning Assessment:  
No flowsheet data found. Depression Screening:  
 
3 most recent PHQ Screens 2019 Little interest or pleasure in doing things Not at all Feeling down, depressed, irritable, or hopeless Several days Total Score PHQ 2 1 Fall Risk Assessment:  
No flowsheet data found. Abuse Screening: No flowsheet data found. Coordination of Care Questionaire: 1. Have you been to the ER, urgent care clinic since your last visit? Hospitalized since your last visit? NO 
 
2. Have you seen or consulted any other health care providers outside of the [address] [place] [address] since your last visit? Include any pap smears or colon screening. YES; radiation treatment for right breast cancer Advanced Directive: 1. Do you have an Advanced Directive? NO 
 
2. Would you like information on Advanced Directives?  NO

## 2019-02-14 NOTE — PROGRESS NOTES
Subjective:  
 
Jessica Sibley is a 41 y.o. female who presents for evaluation of redness, decreased vision. She has noticed the above symptoms in the left eye for 2 days. Onset was acute. Symptoms have included discharge, erythema. Patient denies visual field deficit, photophobia, itching. Current Outpatient Medications Medication Sig Dispense Refill  trimethoprim-polymyxin b (POLYTRIM) ophthalmic solution Administer 1 Drop to left eye every four (4) hours for 5 days. 1 Bottle 0  
 TRASTUZUMAB IV by IntraVENous route.  betamethasone valerate (VALISONE) 0.1 % topical cream Apply sparingly to affected areas twice daily, do not use for more than 14 days consecutively without a break 15 g 1  
 ondansetron (ZOFRAN ODT) 8 mg disintegrating tablet DISSOLVE 1 TABLET ORALLY EVERY 8 HOURS AS NEEDED FOR NAUSEA. 3  
 DOCETAXEL IV by IntraVENous route.  CARBOPLATIN IV by IntraVENous route.  PERTUZUMAB IV by IntraVENous route. No Known Allergies Review of Systems 
blurry vision Objective:  
 
Visit Vitals /79 (BP 1 Location: Left arm, BP Patient Position: Sitting) Pulse 80 Temp 98.8 °F (37.1 °C) (Oral) Resp 16 Ht 5' 3\" (1.6 m) Wt 157 lb 9.6 oz (71.5 kg) SpO2 98% BMI 27.92 kg/m² General: alert, cooperative, no distress, appears stated age Eyes:  positive findings: conjunctivae: trace bacterial conjunctivitis Vision: Uncorrected:            L  20/20            R 20/30 Fluorescein:  not done Assessment:  
 
acute conjunctivitis Plan: 1. Discussed the diagnosis and proper care of conjunctivitis. Stressed household hygiene. 2. Ophthalmic drops per orders. 3. Warm compress to eye(s). 4. Local eye care discussed. 5. Analgesics as needed. 6. Patient instructions: contagiousness precautions 7. Risks and side effects of medications was discussed.    
8. Pt advised to read written information provided by the pharmacist.

## 2019-02-14 NOTE — PATIENT INSTRUCTIONS
Pinkeye: Care Instructions Your Care Instructions Pinkeye is redness and swelling of the eye surface and the conjunctiva (the lining of the eyelid and the covering of the white part of the eye). Pinkeye is also called conjunctivitis. Pinkeye is often caused by infection with bacteria or a virus. Dry air, allergies, smoke, and chemicals are other common causes. Pinkeye often clears on its own in 7 to 10 days. Antibiotics only help if the pinkeye is caused by bacteria. Pinkeye caused by infection spreads easily. If an allergy or chemical is causing pinkeye, it will not go away unless you can avoid whatever is causing it. Follow-up care is a key part of your treatment and safety. Be sure to make and go to all appointments, and call your doctor if you are having problems. It's also a good idea to know your test results and keep a list of the medicines you take. How can you care for yourself at home? · Wash your hands often. Always wash them before and after you treat pinkeye or touch your eyes or face. · Use moist cotton or a clean, wet cloth to remove crust. Wipe from the inside corner of the eye to the outside. Use a clean part of the cloth for each wipe. · Put cold or warm wet cloths on your eye a few times a day if the eye hurts. · Do not wear contact lenses or eye makeup until the pinkeye is gone. Throw away any eye makeup you were using when you got pinkeye. Clean your contacts and storage case. If you wear disposable contacts, use a new pair when your eye has cleared and it is safe to wear contacts again. · If the doctor gave you antibiotic ointment or eyedrops, use them as directed. Use the medicine for as long as instructed, even if your eye starts looking better soon. Keep the bottle tip clean, and do not let it touch the eye area. · To put in eyedrops or ointment: ? Tilt your head back, and pull your lower eyelid down with one finger. ? Drop or squirt the medicine inside the lower lid. ? Close your eye for 30 to 60 seconds to let the drops or ointment move around. ? Do not touch the ointment or dropper tip to your eyelashes or any other surface. · Do not share towels, pillows, or washcloths while you have pinkeye. When should you call for help? Call your doctor now or seek immediate medical care if: 
  · You have pain in your eye, not just irritation on the surface.  
  · You have a change in vision or loss of vision.  
  · You have an increase in discharge from the eye.  
  · Your eye has not started to improve or begins to get worse within 48 hours after you start using antibiotics.  
  · Pinkeye lasts longer than 7 days.  
 Watch closely for changes in your health, and be sure to contact your doctor if you have any problems. Where can you learn more? Go to http://nomi-dexter.info/. Enter Y392 in the search box to learn more about \"Pinkeye: Care Instructions. \" Current as of: September 23, 2018 Content Version: 11.9 © 9239-4297 [employer]wise, Incorporated. Care instructions adapted under license by [employer] (which disclaims liability or warranty for this information). If you have questions about a medical condition or this instruction, always ask your healthcare professional. [address] any warranty or liability for your use of this information.

## 2019-05-09 ENCOUNTER — OFFICE VISIT (OUTPATIENT)
Dept: FAMILY MEDICINE CLINIC | Age: 39
End: 2019-05-09

## 2019-05-09 VITALS
HEART RATE: 80 BPM | OXYGEN SATURATION: 98 % | BODY MASS INDEX: 28.7 KG/M2 | TEMPERATURE: 98.3 F | DIASTOLIC BLOOD PRESSURE: 80 MMHG | HEIGHT: 63 IN | RESPIRATION RATE: 17 BRPM | SYSTOLIC BLOOD PRESSURE: 112 MMHG | WEIGHT: 162 LBS

## 2019-05-09 DIAGNOSIS — H92.02 OTALGIA OF LEFT EAR: Primary | ICD-10-CM

## 2019-05-09 RX ORDER — TAMOXIFEN CITRATE 20 MG/1
1 TABLET ORAL DAILY
Refills: 11 | COMMUNITY
Start: 2019-05-06

## 2019-05-09 NOTE — PROGRESS NOTES
Rony Spears is a 41 y.o. female (: 1980) presenting to address:    Chief Complaint   Patient presents with    Ear Pain     left is worse       Vitals:    19 1341   BP: 112/80   Pulse: 80   Resp: 17   Temp: 98.3 °F (36.8 °C)   TempSrc: Oral   SpO2: 98%   Weight: 162 lb (73.5 kg)   Height: 5' 3\" (1.6 m)   PainSc:   4   PainLoc: Ear   LMP: 2019       Hearing/Vision:   No exam data present    Learning Assessment:   No flowsheet data found. Depression Screening:     3 most recent PHQ Screens 2019   Little interest or pleasure in doing things Not at all   Feeling down, depressed, irritable, or hopeless Not at all   Total Score PHQ 2 0     Fall Risk Assessment:   No flowsheet data found. Abuse Screening:   No flowsheet data found. Coordination of Care Questionaire:   1. Have you been to the ER, urgent care clinic since your last visit? Hospitalized since your last visit? No   2. Have you seen or consulted any other health care providers outside of the [address] [place] [address] since your last visit? Include any pap smears or colon screening. No       Advanced Directive:   1. Do you have an Advanced Directive? No   2. Would you like information on Advanced Directives?   No        Health Maintenance Due   Topic Date Due    DTaP/Tdap/Td series (1 - Tdap) 2003

## 2019-05-09 NOTE — PROGRESS NOTES
Subjective:      Liz Fofana is a 41 y.o. female who presents for possible ear infection. Symptoms include left ear pain, plugged sensation in left ear and congestion. Onset of symptoms was 1 week ago, stable since that time. Associated symptoms include recent illness with sinus infection. Past Medical History:   Diagnosis Date    Anemia     Breast cancer ([facility] [address]     Stage 1 Right breast     Hypokalemia 10/1/2018    Microcytic anemia 10/1/2018    Potassium (K) deficiency      Current Outpatient Medications   Medication Sig Dispense Refill    ondansetron (ZOFRAN ODT) 8 mg disintegrating tablet DISSOLVE 1 TABLET ORALLY EVERY 8 HOURS AS NEEDED FOR NAUSEA. 3    TRASTUZUMAB IV by IntraVENous route.  tamoxifen (NOLVADEX) 20 mg tablet Take 1 Tab by mouth daily. 11    betamethasone valerate (VALISONE) 0.1 % topical cream Apply sparingly to affected areas twice daily, do not use for more than 14 days consecutively without a break 15 g 1    DOCETAXEL IV by IntraVENous route.  CARBOPLATIN IV by IntraVENous route.  PERTUZUMAB IV by IntraVENous route.        No Known Allergies  Social History     Socioeconomic History    Marital status:      Spouse name: Not on file    Number of children: Not on file    Years of education: Not on file    Highest education level: Not on file   Occupational History    Not on file   Social Needs    Financial resource strain: Not on file    Food insecurity:     Worry: Not on file     Inability: Not on file    Transportation needs:     Medical: Not on file     Non-medical: Not on file   Tobacco Use    Smoking status: Never Smoker    Smokeless tobacco: Never Used   Substance and Sexual Activity    Alcohol use: No    Drug use: No    Sexual activity: Never   Lifestyle    Physical activity:     Days per week: Not on file     Minutes per session: Not on file    Stress: Not on file   Relationships    Social connections:     Talks on phone: Not on file Gets together: Not on file     Attends Synagogue service: Not on file     Active member of club or organization: Not on file     Attends meetings of clubs or organizations: Not on file     Relationship status: Not on file    Intimate partner violence:     Fear of current or ex partner: Not on file     Emotionally abused: Not on file     Physically abused: Not on file     Forced sexual activity: Not on file   Other Topics Concern    Not on file   Social History Narrative    ** Merged History Encounter **          Review of Systems  Pertinent items are noted in HPI. Objective:     Visit Vitals  /80 (BP 1 Location: Left arm, BP Patient Position: Sitting)   Pulse 80   Temp 98.3 °F (36.8 °C) (Oral)   Resp 17   Ht 5' 3\" (1.6 m)   Wt 162 lb (73.5 kg)   LMP 04/21/2019   SpO2 98%   BMI 28.70 kg/m²     General:  alert, cooperative, no distress, appears stated age   Head:  NCAT w/o lesions or tenderness   Eyes: negative   Right Ear: normal appearance, normal TMs bilaterally   Left Ear: normal appearance, normal TMs bilaterally   Mouth:  Lips, mucosa, and tongue normal. Teeth and gums normal   Neck: supple, symmetrical, trachea midline, no adenopathy, thyroid: not enlarged, symmetric, no tenderness/mass/nodules, no carotid bruit and no JVD. Lungs: clear to auscultation bilaterally   Heart:  regular rate and rhythm, S1, S2 normal, no murmur, click, rub or gallop   Skin: Normal. and no rash or abnormalities        Assessment/Plan:       Encounter Diagnoses   Name Primary?     Otalgia of left ear Yes     Orders Placed This Encounter    tamoxifen (NOLVADEX) 20 mg tablet    motrin for pain   Monitor for now rtc as needed if worsens

## 2019-05-09 NOTE — PATIENT INSTRUCTIONS
Earache: Care Instructions  Your Care Instructions    Even though infection is a common cause of ear pain, not all ear pain means an infection. If you have ear pain and don't have an infection, it could be because of a jaw problem, such as temporomandibular joint (TMJ) pain. Or it could be because of a neck problem. When ear discomfort or pain is mild or comes and goes without other symptoms, home treatment may be all you need. Follow-up care is a key part of your treatment and safety. Be sure to make and go to all appointments, and call your doctor if you are having problems. It's also a good idea to know your test results and keep a list of the medicines you take. How can you care for yourself at home? · Apply heat on the ear to ease pain. To apply heat, put a warm water bottle, a heating pad set on low, or a warm cloth on your ear. Do not go to sleep with a heating pad on your skin. · Take an over-the-counter pain medicine, such as acetaminophen (Tylenol), ibuprofen (Advil, Motrin), or naproxen (Aleve). Be safe with medicines. Read and follow all instructions on the label. · Do not take two or more pain medicines at the same time unless the doctor told you to. Many pain medicines have acetaminophen, which is Tylenol. Too much acetaminophen (Tylenol) can be harmful. · Never insert anything, such as a cotton swab or a millicent pin, into the ear. When should you call for help? Call your doctor now or seek immediate medical care if:    · You have new or worse symptoms of infection, such as:  ? Increased pain, swelling, warmth, or redness. ? Red streaks leading from the area. ? Pus draining from the area. ? A fever.    Watch closely for changes in your health, and be sure to contact your doctor if:    · You have new or worse discharge coming from the ear.     · You do not get better as expected. Where can you learn more? Go to http://nomi-dexter.info/.   Enter J553 in the search box to learn more about \"Earache: Care Instructions. \"  Current as of: March 27, 2018  Content Version: 11.9  © 5036-1834 [employer]. Care instructions adapted under license by [employer] (which disclaims liability or warranty for this information). If you have questions about a medical condition or this instruction, always ask your healthcare professional. [address] any warranty or liability for your use of this information.

## 2019-07-22 ENCOUNTER — TELEPHONE (OUTPATIENT)
Dept: FAMILY MEDICINE CLINIC | Age: 39
End: 2019-07-22

## 2019-07-22 ENCOUNTER — OFFICE VISIT (OUTPATIENT)
Dept: FAMILY MEDICINE CLINIC | Age: 39
End: 2019-07-22

## 2019-07-22 VITALS
TEMPERATURE: 99.2 F | SYSTOLIC BLOOD PRESSURE: 118 MMHG | WEIGHT: 163.6 LBS | DIASTOLIC BLOOD PRESSURE: 79 MMHG | HEART RATE: 79 BPM | BODY MASS INDEX: 28.99 KG/M2 | RESPIRATION RATE: 18 BRPM | HEIGHT: 63 IN | OXYGEN SATURATION: 98 %

## 2019-07-22 DIAGNOSIS — Z79.810 CARE RELATED TO CURRENT TAMOXIFEN USE: ICD-10-CM

## 2019-07-22 DIAGNOSIS — Z17.1 MALIGNANT NEOPLASM OF RIGHT BREAST IN FEMALE, ESTROGEN RECEPTOR NEGATIVE, UNSPECIFIED SITE OF BREAST (HCC): Primary | ICD-10-CM

## 2019-07-22 DIAGNOSIS — Z01.419 PAP SMEAR, LOW-RISK: ICD-10-CM

## 2019-07-22 DIAGNOSIS — C50.911 MALIGNANT NEOPLASM OF RIGHT BREAST IN FEMALE, ESTROGEN RECEPTOR NEGATIVE, UNSPECIFIED SITE OF BREAST (HCC): Primary | ICD-10-CM

## 2019-07-22 NOTE — PROGRESS NOTES
Jose Roach is a 42 y.o. female (: 1980) presenting to address:    Chief Complaint   Patient presents with    Well Woman       Vitals:    19 1035   BP: 118/79   Pulse: 79   Resp: 18   Temp: 99.2 °F (37.3 °C)   TempSrc: Oral   SpO2: 98%   Weight: 163 lb 9.6 oz (74.2 kg)   Height: 5' 3\" (1.6 m)   PainSc:   0 - No pain   LMP: 2019       Hearing/Vision:   No exam data present    Learning Assessment:   No flowsheet data found. Depression Screening:     3 most recent PHQ Screens 2019   Little interest or pleasure in doing things Not at all   Feeling down, depressed, irritable, or hopeless Not at all   Total Score PHQ 2 0     Fall Risk Assessment:   No flowsheet data found. Abuse Screening:   No flowsheet data found. Coordination of Care Questionaire:   1. Have you been to the ER, urgent care clinic since your last visit? Hospitalized since your last visit? NO    2. Have you seen or consulted any other health care providers outside of the [address] [place] [address] since your last visit? Include any pap smears or colon screening. Yes Dr. Perez Spotted    Advanced Directive:   1. Do you have an Advanced Directive? NO    2. Would you like information on Advanced Directives?  NO

## 2019-07-22 NOTE — PATIENT INSTRUCTIONS
Repeat Pap after starting tamoxifen. Pap Test: Care Instructions  Your Care Instructions    The Pap test (also called a Pap smear) is a screening test for cancer of the cervix, which is the lower part of the uterus that opens into the vagina. The test can help your doctor find early changes in the cells that could lead to cancer. The sample of cells taken during your test has been sent to a lab so that an expert can look at the cells. It usually takes a week or two to get the results back. Follow-up care is a key part of your treatment and safety. Be sure to make and go to all appointments, and call your doctor if you are having problems. It's also a good idea to know your test results and keep a list of the medicines you take. What do the results mean? · A normal result means that the test did not find any abnormal cells in the sample. · An abnormal result can mean many things. Most of these are not cancer. The results of your test may be abnormal because:  ? You have an infection of the vagina or cervix, such as a yeast infection. ? You have an IUD (intrauterine device for birth control). ? You have low estrogen levels after menopause that are causing the cells to change. ? You have cell changes that may be a sign of precancer or cancer. The results are ranked based on how serious the changes might be. There are many other reasons why you might not get a normal result. If the results were abnormal, you may need to get another test within a few weeks or months. If the results show changes that could be a sign of cancer, you may need a test called a colposcopy, which provides a more complete view of the cervix. Sometimes the lab cannot use the sample because it does not contain enough cells or was not preserved well. If so, you may need to have the test again. This is not common, but it does happen from time to time. When should you call for help?   Watch closely for changes in your health, and be sure to contact your doctor if:    · You have vaginal bleeding or pain for more than 2 days after the test. It is normal to have a small amount of bleeding for a day or two after the test.   Where can you learn more? Go to http://nomi-dexter.info/. Enter A692 in the search box to learn more about \"Pap Test: Care Instructions. \"  Current as of: December 19, 2018  Content Version: 12.1  © 0976-7757 [employer]. Care instructions adapted under license by [employer] (which disclaims liability or warranty for this information). If you have questions about a medical condition or this instruction, always ask your healthcare professional. [place] [address] any warranty or liability for your use of this information.

## 2019-07-22 NOTE — PROGRESS NOTES
Subjective:   42 y.o. female for pap. Patient's last menstrual period was 06/20/2019. Social History: single partner, contraception - condoms. Pertinent past medical hstory: + breast cancer hx now on suppression meds. Current Outpatient Medications   Medication Sig Dispense Refill    tamoxifen (NOLVADEX) 20 mg tablet Take 1 Tab by mouth daily. 11    ondansetron (ZOFRAN ODT) 8 mg disintegrating tablet DISSOLVE 1 TABLET ORALLY EVERY 8 HOURS AS NEEDED FOR NAUSEA. 3    betamethasone valerate (VALISONE) 0.1 % topical cream Apply sparingly to affected areas twice daily, do not use for more than 14 days consecutively without a break 15 g 1    DOCETAXEL IV by IntraVENous route.  CARBOPLATIN IV by IntraVENous route.  TRASTUZUMAB IV by IntraVENous route.  PERTUZUMAB IV by IntraVENous route. No Known Allergies     ROS:  Feeling well. No dyspnea or chest pain on exertion. No abdominal pain, change in bowel habits, black or bloody stools. No urinary tract symptoms. GYN ROS: normal menses, no abnormal bleeding, pelvic pain or discharge, no breast pain or new or enlarging lumps on self exam. No neurological complaints. Objective:     Visit Vitals  /79 (BP 1 Location: Left arm, BP Patient Position: Sitting)   Pulse 79   Temp 99.2 °F (37.3 °C) (Oral)   Resp 18   Ht 5' 3\" (1.6 m)   Wt 163 lb 9.6 oz (74.2 kg)   LMP 06/20/2019   SpO2 98%   BMI 28.98 kg/m²     The patient appears well, alert, oriented x 3, in no distress. ENT normal.  Neck supple. No adenopathy or thyromegaly. HARI. Lungs are clear, good air entry, no wheezes, rhonchi or rales. S1 and S2 normal, no murmurs, regular rate and rhythm. Abdomen soft without tenderness, guarding, mass or organomegaly. Extremities show no edema, normal peripheral pulses. Neurological is normal, no focal findings.     BREAST EXAM: breasts appear normal, no suspicious masses, no skin or nipple changes or axillary nodes    PELVIC EXAM: VULVA: normal appearing vulva with no masses, tenderness or lesions, VAGINA: normal appearing vagina with normal color and discharge, no lesions, CERVIX: normal appearing cervix without discharge or lesions, UTERUS: uterus is normal size, shape, consistency and nontender, ADNEXA: normal adnexa in size, nontender and no masses, RECTAL: normal rectal, no masses    Assessment/Plan:     Encounter Diagnoses   Name Primary?  Malignant neoplasm of right breast in female, estrogen receptor negative, unspecified site of breast ([facility] [address] Yes    Pap smear, low-risk     Care related to current tamoxifen use      Orders Placed This Encounter    PAP + HPV DNA (HIGH RISK)     Due to patient's history of breast cancer and current use of suppressive therapies with tamoxifen patient was requested to have Pap 6 months after starting tamoxifen. Previous Pap within normal limits was done previous to starting tamoxifen so repeat Pap is associated with her diagnosis of breast cancer at this time. Patient will be called with results and any plan needed. If within normal limits will follow the guidelines suggested by GYN oncology going forward.   Over half of this 30-minute appointment was spent in counseling and coordination of care

## 2019-07-25 LAB
CYTOLOGIST CVX/VAG CYTO: NORMAL
CYTOLOGY CVX/VAG DOC CYTO: NORMAL
DX ICD CODE: NORMAL
HPV I/H RISK 1 DNA CVX QL PROBE+SIG AMP: NEGATIVE
Lab: NORMAL
OTHER STN SPEC: NORMAL
STAT OF ADQ CVX/VAG CYTO-IMP: NORMAL

## 2019-09-19 PROBLEM — Z01.419 PAP SMEAR, LOW-RISK: Status: RESOLVED | Noted: 2019-07-22 | Resolved: 2019-09-19

## 2020-01-27 ENCOUNTER — OFFICE VISIT (OUTPATIENT)
Dept: FAMILY MEDICINE CLINIC | Age: 40
End: 2020-01-27

## 2020-01-27 VITALS
SYSTOLIC BLOOD PRESSURE: 116 MMHG | OXYGEN SATURATION: 100 % | DIASTOLIC BLOOD PRESSURE: 82 MMHG | RESPIRATION RATE: 16 BRPM | BODY MASS INDEX: 27.82 KG/M2 | HEIGHT: 63 IN | HEART RATE: 88 BPM | WEIGHT: 157 LBS | TEMPERATURE: 98.4 F

## 2020-01-27 DIAGNOSIS — Z00.00 ROUTINE GENERAL MEDICAL EXAMINATION AT A HEALTH CARE FACILITY: ICD-10-CM

## 2020-01-27 DIAGNOSIS — N89.8 VAGINAL DISCHARGE: ICD-10-CM

## 2020-01-27 DIAGNOSIS — B37.31 VAGINAL CANDIDA: Primary | ICD-10-CM

## 2020-01-27 DIAGNOSIS — C50.919 DUCTAL CARCINOMA OF BREAST, UNSPECIFIED LATERALITY (HCC): ICD-10-CM

## 2020-01-27 PROBLEM — D50.9 MICROCYTIC ANEMIA: Status: RESOLVED | Noted: 2018-10-01 | Resolved: 2020-01-27

## 2020-01-27 PROBLEM — E87.6 HYPOKALEMIA: Status: RESOLVED | Noted: 2018-10-01 | Resolved: 2020-01-27

## 2020-01-27 PROBLEM — Z79.810 CARE RELATED TO CURRENT TAMOXIFEN USE: Status: RESOLVED | Noted: 2019-07-22 | Resolved: 2020-01-27

## 2020-01-27 PROBLEM — C50.911 MALIGNANT NEOPLASM OF RIGHT BREAST IN FEMALE, ESTROGEN RECEPTOR NEGATIVE (HCC): Status: RESOLVED | Noted: 2018-06-06 | Resolved: 2020-01-27

## 2020-01-27 PROBLEM — Z17.1 MALIGNANT NEOPLASM OF RIGHT BREAST IN FEMALE, ESTROGEN RECEPTOR NEGATIVE (HCC): Status: RESOLVED | Noted: 2018-06-06 | Resolved: 2020-01-27

## 2020-01-27 LAB — WET MOUNT POCT, WMPOCT: NORMAL

## 2020-01-27 RX ORDER — FLUCONAZOLE 150 MG/1
150 TABLET ORAL DAILY
Qty: 1 TAB | Refills: 0 | Status: SHIPPED | OUTPATIENT
Start: 2020-01-27 | End: 2020-01-28

## 2020-01-27 NOTE — PROGRESS NOTES
Pankaj Cortez is a 42 y.o. female (: 1980) presenting to address:    Chief Complaint   Patient presents with    Vaginal Discharge     with itching       Vitals:    20 1323   BP: 116/82   Pulse: 88   Resp: 16   Temp: 98.4 °F (36.9 °C)   SpO2: 100%   Weight: 157 lb (71.2 kg)   Height: 5' 3\" (1.6 m)   PainSc:   0 - No pain       Hearing/Vision:   No exam data present    Learning Assessment:   No flowsheet data found. Depression Screening:     3 most recent PHQ Screens 2020   Little interest or pleasure in doing things Not at all   Feeling down, depressed, irritable, or hopeless Not at all   Total Score PHQ 2 0     Fall Risk Assessment:   No flowsheet data found. Abuse Screening:     Abuse Screening Questionnaire 2019   Do you ever feel afraid of your partner? N   Are you in a relationship with someone who physically or mentally threatens you? N   Is it safe for you to go home? Y     Coordination of Care Questionaire:   1. Have you been to the ER, urgent care clinic since your last visit? Hospitalized since your last visit? YES    2. Have you seen or consulted any other health care providers outside of the [facility] since your last visit? Include any pap smears or colon screening. NO    Advanced Directive:   1. Do you have an Advanced Directive? NO    2. Would you like information on Advanced Directives?  YES

## 2020-01-27 NOTE — PATIENT INSTRUCTIONS
[facility] [address], [address] [facility], [address] 
[phone]

## 2020-05-15 ENCOUNTER — VIRTUAL VISIT (OUTPATIENT)
Dept: FAMILY MEDICINE CLINIC | Age: 40
End: 2020-05-15

## 2020-05-15 DIAGNOSIS — R59.1 LYMPHADENOPATHY OF HEAD AND NECK: Primary | ICD-10-CM

## 2020-05-15 DIAGNOSIS — F32.81 PMDD (PREMENSTRUAL DYSPHORIC DISORDER): ICD-10-CM

## 2020-05-15 NOTE — PROGRESS NOTES
David Villatoro is a 42 y.o. female who was seen by synchronous (real-time) audio-video technology on 5/15/2020. Consent: David Villatoro, who was seen by synchronous (real-time) audio-video technology, and/or her healthcare decision maker, is aware that this patient-initiated, Telehealth encounter on 5/15/2020 is a billable service, with coverage as determined by her insurance carrier. She is aware that she may receive a bill and has provided verbal consent to proceed: Yes. Assessment & Plan:   Diagnoses and all orders for this visit:    1. Lymphadenopathy of head and neck  -has labs next month with oncology  -monitor, likely reactive to viral URI      2. PMDD (premenstrual dysphoric disorder)  -consider zoloft. Pt will think about this. Subjective:   David Villatoro is a 42 y.o. female who was seen for Neck Pain    Pt c/o neck pain x 1 day. States she woke up with soreness in L neck and has associated pea-sized nodule in submandibular region. She feels foggy today. +slight HA. No temp. No dental issues. But is biting inner cheek due to anxiety. She notes PMDD. Wants to know if there is something she can take. Prior to Admission medications    Medication Sig Start Date End Date Taking? Authorizing Provider   tamoxifen (NOLVADEX) 20 mg tablet Take 1 Tab by mouth daily. 5/6/19   Provider, Historical     No Known Allergies    Patient Active Problem List   Diagnosis Code    Sickle-cell trait ([facility] [address] D57.3    Ductal carcinoma of breast ([facility] [address] C50.919       Review of Systems   Constitutional: Negative for chills and fever. HENT: Negative for congestion. Respiratory: Negative for cough. Musculoskeletal: Positive for neck pain. Skin: Negative for rash. Psychiatric/Behavioral: Positive for depression. Objective:   Vital Signs: (As obtained by patient/caregiver at home)  There were no vitals taken for this visit.      [INSTRUCTIONS:  \"[x]\" Indicates a positive item  \"[]\" Indicates a negative item  -- DELETE ALL ITEMS NOT EXAMINED]    Constitutional: [x] Appears well-developed and well-nourished [x] No apparent distress      [] Abnormal -     Mental status: [x] Alert and awake  [x] Oriented to person/place/time [x] Able to follow commands    [] Abnormal -     Eyes:   EOM    [x]  Normal    [] Abnormal -   Sclera  [x]  Normal    [] Abnormal -          Discharge [x]  None visible   [] Abnormal -     HENT: [x] Normocephalic, atraumatic  [] Abnormal -   [x] Mouth/Throat: Mucous membranes are moist    External Ears [x] Normal  [] Abnormal -    Neck: [x] No visualized mass [] Abnormal -     Pulmonary/Chest: [x] Respiratory effort normal   [x] No visualized signs of difficulty breathing or respiratory distress        [] Abnormal -      Musculoskeletal:   [x] Normal gait with no signs of ataxia         [x] Normal range of motion of neck        [] Abnormal -     Neurological:        [x] No Facial Asymmetry (Cranial nerve 7 motor function) (limited exam due to video visit)          [x] No gaze palsy        [] Abnormal -          Skin:        [x] No significant exanthematous lesions or discoloration noted on facial skin         [] Abnormal -            Psychiatric:       [x] Normal Affect [] Abnormal -        [x] No Hallucinations    Other pertinent observable physical exam findings:-        We discussed the expected course, resolution and complications of the diagnosis(es) in detail. Medication risks, benefits, costs, interactions, and alternatives were discussed as indicated. I advised her to contact the office if her condition worsens, changes or fails to improve as anticipated. She expressed understanding with the diagnosis(es) and plan. Alicai Burt is a 42 y.o. female who was evaluated by a video visit encounter for concerns as above. Patient identification was verified prior to start of the visit. A caregiver was present when appropriate.  Due to this being a TeleHealth encounter (During DRTJC-81 public health emergency), evaluation of the following organ systems was limited: Vitals/Constitutional/EENT/Resp/CV/GI//MS/Neuro/Skin/Heme-Lymph-Imm. Pursuant to the emergency declaration under the [address] [place] [address], [facility] waiver authority and the [employer] and [employer] Act, this Virtual  Visit was conducted, with patient's (and/or legal guardian's) consent, to reduce the patient's risk of exposure to COVID-19 and provide necessary medical care. Services were provided through a video synchronous discussion virtually to substitute for in-person clinic visit. Patient and provider were located at their individual homes.       Guerita Kyle MD

## 2020-07-13 ENCOUNTER — VIRTUAL VISIT (OUTPATIENT)
Dept: FAMILY MEDICINE CLINIC | Age: 40
End: 2020-07-13

## 2020-07-13 DIAGNOSIS — F32.81 PMDD (PREMENSTRUAL DYSPHORIC DISORDER): Primary | ICD-10-CM

## 2020-07-13 NOTE — PROGRESS NOTES
Matt Menendez is a 47 y.o. female who was seen by synchronous (real-time) audio-video technology on 7/13/2020 for Follow-up        Assessment & Plan:   Diagnoses and all orders for this visit:    1. PMDD (premenstrual dysphoric disorder)    - SSRI, which is mainstay of treatment interacts w/ tamoxifen. Effexor, which also has data to support use also interacts. Hormone therapy is obviously not an option. I discussed working with therapist/CBT, meditation, etc.         Subjective:     Pt states she has PMDD. States she gets depressed, had \"terrible thoughts\". Last month was the worst. Her sx last 5-6 days prior to cycle each month. Prior to Admission medications    Medication Sig Start Date End Date Taking? Authorizing Provider   tamoxifen (NOLVADEX) 20 mg tablet Take 1 Tab by mouth daily. 5/6/19   Provider, Historical     Patient Active Problem List   Diagnosis Code    Sickle-cell trait ([facility] [address] D57.3    Ductal carcinoma of breast ([facility] [address] C50.919       Review of Systems   Psychiatric/Behavioral: Negative for depression. The patient is not nervous/anxious. Objective:   No flowsheet data found.      [INSTRUCTIONS:  \"[x]\" Indicates a positive item  \"[]\" Indicates a negative item  -- DELETE ALL ITEMS NOT EXAMINED]    Constitutional: [x] Appears well-developed and well-nourished [x] No apparent distress      [] Abnormal -     Mental status: [x] Alert and awake  [x] Oriented to person/place/time [x] Able to follow commands    [] Abnormal -     Eyes:   EOM    [x]  Normal    [] Abnormal -   Sclera  [x]  Normal    [] Abnormal -          Discharge [x]  None visible   [] Abnormal -     HENT: [x] Normocephalic, atraumatic  [] Abnormal -   [x] Mouth/Throat: Mucous membranes are moist    External Ears [x] Normal  [] Abnormal -    Neck: [x] No visualized mass [] Abnormal -     Pulmonary/Chest: [x] Respiratory effort normal   [x] No visualized signs of difficulty breathing or respiratory distress        [] Abnormal - Musculoskeletal:   [] Normal gait with no signs of ataxia         [] Normal range of motion of neck        [] Abnormal -     Neurological:        [] No Facial Asymmetry (Cranial nerve 7 motor function) (limited exam due to video visit)          [] No gaze palsy        [] Abnormal -          Skin:        [] No significant exanthematous lesions or discoloration noted on facial skin         [] Abnormal -            Psychiatric:       [x] Normal Affect [] Abnormal -        [x] No Hallucinations    Other pertinent observable physical exam findings:-        We discussed the expected course, resolution and complications of the diagnosis(es) in detail. Medication risks, benefits, costs, interactions, and alternatives were discussed as indicated. I advised her to contact the office if her condition worsens, changes or fails to improve as anticipated. She expressed understanding with the diagnosis(es) and plan. Ney Marquez, who was evaluated through a patient-initiated, synchronous (real-time) audio-video encounter, and/or her healthcare decision maker, is aware that it is a billable service, with coverage as determined by her insurance carrier. She provided verbal consent to proceed: Yes, and patient identification was verified. It was conducted pursuant to the emergency declaration under the [address] [place] [address] authority and the [employer] and [employer] Act. A caregiver was present when appropriate. Ability to conduct physical exam was limited. I was at home. The patient was at home.       Vahid Martin MD

## 2020-10-08 ENCOUNTER — VIRTUAL VISIT (OUTPATIENT)
Dept: FAMILY MEDICINE CLINIC | Age: 40
End: 2020-10-08
Payer: MEDICAID

## 2020-10-08 DIAGNOSIS — F32.81 PMDD (PREMENSTRUAL DYSPHORIC DISORDER): Primary | ICD-10-CM

## 2020-10-08 PROCEDURE — 99213 OFFICE O/P EST LOW 20 MIN: CPT | Performed by: INTERNAL MEDICINE

## 2020-10-08 NOTE — PROGRESS NOTES
Jas Segovia is a 47 y.o. female who was seen by synchronous (real-time) audio-video technology on 10/8/2020 for Other (gyn issue)    Assessment & Plan:   Diagnoses and all orders for this visit:    1. PMDD (premenstrual dysphoric disorder)    -pt to consider zoloft. effexor can cause qt prolongation/arrhythmias and I am not comfortable prescribing that with tamoxifen. Pt wishes to discuss this with her oncologist and will let me know what she decided. Subjective:   Pt with h/o breast ca on tamoxifen presents with c/o PMDD. She spoke to oncology who recommended effexor. She wants to take this. She gets severe depression 1 week prior to cycle starting. She gets irritable. It affects her ability to go to work. Prior to Admission medications    Medication Sig Start Date End Date Taking? Authorizing Provider   tamoxifen (NOLVADEX) 20 mg tablet Take 1 Tab by mouth daily. 5/6/19   Provider, Historical     Patient Active Problem List   Diagnosis Code    Sickle-cell trait ([facility] D57.3    [facility] C50.919       ROS    Objective:   No flowsheet data found.      [INSTRUCTIONS:  \"[x]\" Indicates a positive item  \"[]\" Indicates a negative item  -- DELETE ALL ITEMS NOT EXAMINED]    Constitutional: [] Appears well-developed and well-nourished [x] No apparent distress      [] Abnormal -     Mental status: [x] Alert and awake  [x] Oriented to person/place/time [x] Able to follow commands    [] Abnormal -     Eyes:   EOM    []  Normal    [] Abnormal -   Sclera  []  Normal    [] Abnormal -          Discharge []  None visible   [] Abnormal -     HENT: [] Normocephalic, atraumatic  [] Abnormal -   [] Mouth/Throat: Mucous membranes are moist    External Ears [] Normal  [] Abnormal -    Neck: [] No visualized mass [] Abnormal -     Pulmonary/Chest: [] Respiratory effort normal   [] No visualized signs of difficulty breathing or respiratory distress        [] Abnormal -      Musculoskeletal:   [] Normal gait with no signs of ataxia         [] Normal range of motion of neck        [] Abnormal -     Neurological:        [] No Facial Asymmetry (Cranial nerve 7 motor function) (limited exam due to video visit)          [] No gaze palsy        [] Abnormal -          Skin:        [] No significant exanthematous lesions or discoloration noted on facial skin         [] Abnormal -            Psychiatric:       [x] Normal Affect [] Abnormal -        [x] No Hallucinations    Other pertinent observable physical exam findings:-        We discussed the expected course, resolution and complications of the diagnosis(es) in detail. Medication risks, benefits, costs, interactions, and alternatives were discussed as indicated. I advised her to contact the office if her condition worsens, changes or fails to improve as anticipated. She expressed understanding with the diagnosis(es) and plan. Yi Arevalo, who was evaluated through a patient-initiated, synchronous (real-time) audio-video encounter, and/or her healthcare decision maker, is aware that it is a billable service, with coverage as determined by her insurance carrier. She provided verbal consent to proceed: Yes, and patient identification was verified. It was conducted pursuant to the emergency declaration under the [facility], [address] [place] [address] authority and the [employer] Act. A caregiver was present when appropriate. Ability to conduct physical exam was limited. I was in the office. The patient was at home.       Zora Barron MD

## 2021-08-24 NOTE — PROGRESS NOTES
2nd outreach attempt to patient to follow-up on in-home services ([facility])  Per chart review, [facility] services are in place  Unable to leave voicemail as mailbox is full  Outreached [facility] to inquire if  is following patient's case  Confirmed patient is current with RN, PT, OT, and Social Work services  [facility]  Awaiting return call from [address] [place] [address]  Chief Complaint   Patient presents with    Vaginal Discharge     with itching       HPI:  42 y.o. female patient of NP Carol Juárez who presents w/ c/o vaginal itching and discharge and to transfer care  X several weeks. Tried eating more yogurt, with improvement. But sx returned about 1 weeks ago. Patient declines STD testing b/c she isn't sexually active. Pt has h/o R reast ca  ER+/MN+ and HER2+. She underwent XRT, neoadjuvant chemo (finished 10/2018), s/p R lumpectomy and post-operative tamoxifen. Followed by Dr. Sid Murray, plans to change to [facility]. ROS:  Feeling well. No dyspnea or chest pain on exertion. No abdominal pain, change in bowel habits, black or bloody stools. No urinary tract symptoms. No neurological complaints. GYN ROS:  no abnormal bleeding. OBJECTIVE:   Visit Vitals  /82 (BP 1 Location: Left arm, BP Patient Position: Sitting)   Pulse 88   Temp 98.4 °F (36.9 °C)   Resp 16   Ht 5' 3\" (1.6 m)   Wt 157 lb (71.2 kg)   LMP  (Within Weeks) Comment: two weeks ago   SpO2 100%   BMI 27.81 kg/m²     Gen: The patient appears well, alert, in no distress. Pulm: Lungs are clear, good air entry, no wheezes, rhonchi or rales. CV: S1 and S2 normal, no murmurs, regular rate and rhythm. No edema. GI: Abdomen soft. No tenderness, guarding, masses or organomegaly. Pelvic exam: normal external genitalia, vulva, vagina, cervix, uterus and adnexa.+white cheesy discharge    Microscopic wet-mount exam shows hyphae. ASSESSMENT/PLAN:     1. Vaginal candida/discharge.  - fluconazole (DIFLUCAN) 150 mg tablet; Take 1 Tab by mouth daily for 1 day. FDA advises cautious prescribing of oral fluconazole in pregnancy. Dispense: 1 Tab; Refill: 0    2. Vaginal discharge  - AMB POC SMEAR, STAIN & INTERPRET, WET MOUNT    3. Ductal carcinoma of breast, unspecified laterality ([facility]  -transferring to [facility]    4. Routine general medical examination at a health care facility  - CBC W/O DIFF;  Future  - METABOLIC PANEL, COMPREHENSIVE; Future  - LIPID PANEL; Future        The plan was discussed with the patient. The patient verbalized understanding and is in agreement with the plan. All medication potential side effects were discussed with the patient.

## 2021-08-27 ENCOUNTER — APPOINTMENT (OUTPATIENT)
Dept: FAMILY MEDICINE CLINIC | Age: 41
End: 2021-08-27

## 2021-08-27 ENCOUNTER — OFFICE VISIT (OUTPATIENT)
Dept: FAMILY MEDICINE CLINIC | Age: 41
End: 2021-08-27
Payer: MEDICAID

## 2021-08-27 VITALS
OXYGEN SATURATION: 98 % | HEART RATE: 72 BPM | HEIGHT: 63 IN | BODY MASS INDEX: 30.23 KG/M2 | SYSTOLIC BLOOD PRESSURE: 110 MMHG | RESPIRATION RATE: 14 BRPM | DIASTOLIC BLOOD PRESSURE: 78 MMHG | WEIGHT: 170.6 LBS | TEMPERATURE: 98.6 F

## 2021-08-27 DIAGNOSIS — Z13.6 ENCOUNTER FOR LIPID SCREENING FOR CARDIOVASCULAR DISEASE: ICD-10-CM

## 2021-08-27 DIAGNOSIS — I87.2 VENOUS INSUFFICIENCY: Primary | ICD-10-CM

## 2021-08-27 DIAGNOSIS — Z13.220 ENCOUNTER FOR LIPID SCREENING FOR CARDIOVASCULAR DISEASE: ICD-10-CM

## 2021-08-27 DIAGNOSIS — R60.0 LOWER LEG EDEMA: ICD-10-CM

## 2021-08-27 DIAGNOSIS — D57.3 SICKLE-CELL TRAIT (HCC): ICD-10-CM

## 2021-08-27 DIAGNOSIS — Z11.59 NEED FOR HEPATITIS C SCREENING TEST: ICD-10-CM

## 2021-08-27 DIAGNOSIS — Z09 HOSPITAL DISCHARGE FOLLOW-UP: ICD-10-CM

## 2021-08-27 DIAGNOSIS — C50.919 DUCTAL CARCINOMA OF BREAST, UNSPECIFIED LATERALITY (HCC): ICD-10-CM

## 2021-08-27 PROCEDURE — 1111F DSCHRG MED/CURRENT MED MERGE: CPT | Performed by: FAMILY MEDICINE

## 2021-08-27 PROCEDURE — 99213 OFFICE O/P EST LOW 20 MIN: CPT | Performed by: FAMILY MEDICINE

## 2021-08-27 NOTE — PATIENT INSTRUCTIONS
Lab studies ordered, further disposition pending lab results if indicated  Elevate the legs above horizontal as often as possible. Avoid salt and prolonged sitting and standing. Consider support hose or compression stockings.   Schedule back for annual physical exam

## 2021-08-27 NOTE — PROGRESS NOTES
HISTORY OF PRESENT ILLNESS  Diaz Salgado is a 46 y.o. female. Ms. Sim Hinojosa reports concern about swelling in both legs after a long train ride and then some swelling again after a long meeting when she had to remain seated for 3 hours. She denies pain in the legs or feet. The swelling has been the same in both legs. Notes some concern because she takes tamoxifen on a regular basis and understands that it has been associated with an increased risk of thrombosis. Review of Systems   Constitutional: Negative for fever and weight loss. Cardiovascular: Positive for leg swelling ( See HPI). Physical Exam  Vitals and nursing note reviewed. Constitutional:       Appearance: Normal appearance. She is well-developed. HENT:      Head: Normocephalic. Cardiovascular:      Rate and Rhythm: Normal rate. Comments: Palpable pedal pulses  Pulmonary:      Effort: Pulmonary effort is normal.   Musculoskeletal:      Cervical back: Neck supple. Right lower leg: Edema ( Trace ankle edema adjacent to the lateral malleolus) present. Left lower leg: Edema ( Trace ankle edema adjacent to the lateral malleolus) present. Skin:     General: Skin is warm and dry. Neurological:      Mental Status: She is alert and oriented to person, place, and time. Psychiatric:         Behavior: Behavior normal.         ASSESSMENT and PLAN    ICD-10-CM ICD-9-CM    1. Venous insufficiency  I87.2 459.81    2. Lower leg edema  R60.0 782.3 TSH 3RD GENERATION      METABOLIC PANEL, COMPREHENSIVE   3. Sickle-cell trait (HCC)  D57.3 282.5 CBC WITH AUTOMATED DIFF   4. Ductal carcinoma of breast, unspecified laterality (HCC)  C50.919 174.9    5. Encounter for lipid screening for cardiovascular disease  Z13.220 V77.91 LIPID PANEL    Z13.6 V81.2    6.  Need for hepatitis C screening test  Z11.59 V73.89 HEPATITIS C AB      Assessment:  Transient edema most likely secondary to mild venous insufficiency  Health maintenance:  Hepatitis C screening with next blood draw  Tdap immunization    Plan:   Reassured that her history and exam are not consistent with deep venous thrombosis  Lab studies ordered, further disposition pending lab results if indicated  Elevate the legs above horizontal as often as possible. Avoid salt and prolonged sitting and standing. Consider support hose or compression stockings. Schedule back for annual physical exam    Alexa Joshi MD      PLEASE NOTE:   This document has been produced using voice recognition software. Unrecognized errors in transcription may be present.

## 2021-08-27 NOTE — PROGRESS NOTES
Corky Talavera presents today for   Chief Complaint   Patient presents with    Transitions Of Care    Ankle swelling        Visit Vitals  /78 (BP 1 Location: Left upper arm, BP Patient Position: Sitting, BP Cuff Size: Adult)   Pulse 72   Temp 98.6 °F (37 °C) (Temporal)   Resp 14   Ht 5' 3\" (1.6 m)   Wt 170 lb 9.6 oz (77.4 kg)   SpO2 98%   BMI 30.22 kg/m²       Is someone accompanying this pt? no    Is the patient using any DME equipment during OV? no    Depression Screening:  3 most recent PHQ Screens 8/27/2021   PHQ Not Done Patient Decline   Little interest or pleasure in doing things Not at all   Feeling down, depressed, irritable, or hopeless Not at all   Total Score PHQ 2 0       Learning Assessment:  No flowsheet data found. Travel Screening:    Travel Screening     Question   Response    In the last month, have you been in contact with someone who was confirmed or suspected to have Coronavirus / COVID-19? No / Unsure    Have you had a COVID-19 viral test in the last 14 days? No    Do you have any of the following new or worsening symptoms? None of these    Have you traveled internationally or domestically in the last month? No      Travel History   Travel since 07/27/21     No documented travel since 07/27/21          Health Maintenance reviewed and discussed and ordered per Provider. Health Maintenance Due   Topic Date Due    Hepatitis C Screening  Never done    DTaP/Tdap/Td series (1 - Tdap) Never done   . Coordination of Care:  1. Have you been to the ER, urgent care clinic since your last visit? Hospitalized since your last visit? no    2. Have you seen or consulted any other health care providers outside of the [address] [place] [address] since your last visit? Include any pap smears or colon screening. Yes.  Mammogram and Oncology

## 2021-08-28 LAB
ALBUMIN SERPL-MCNC: 4.1 G/DL (ref 3.8–4.8)
ALBUMIN/GLOB SERPL: 1.5 {RATIO} (ref 1.2–2.2)
ALP SERPL-CCNC: 35 IU/L (ref 48–121)
ALT SERPL-CCNC: 11 IU/L (ref 0–32)
AST SERPL-CCNC: 15 IU/L (ref 0–40)
BASOPHILS # BLD AUTO: 0.1 X10E3/UL (ref 0–0.2)
BASOPHILS NFR BLD AUTO: 1 %
BILIRUB SERPL-MCNC: <0.2 MG/DL (ref 0–1.2)
BUN SERPL-MCNC: 13 MG/DL (ref 6–20)
BUN/CREAT SERPL: 19 (ref 9–23)
CALCIUM SERPL-MCNC: 9 MG/DL (ref 8.7–10.2)
CHLORIDE SERPL-SCNC: 107 MMOL/L (ref 96–106)
CHOLEST SERPL-MCNC: 200 MG/DL (ref 100–199)
CO2 SERPL-SCNC: 22 MMOL/L (ref 20–29)
CREAT SERPL-MCNC: 0.7 MG/DL (ref 0.57–1)
EOSINOPHIL # BLD AUTO: 0 X10E3/UL (ref 0–0.4)
EOSINOPHIL NFR BLD AUTO: 1 %
ERYTHROCYTE [DISTWIDTH] IN BLOOD BY AUTOMATED COUNT: 15.5 % (ref 11.7–15.4)
GLOBULIN SER CALC-MCNC: 2.7 G/DL (ref 1.5–4.5)
GLUCOSE SERPL-MCNC: 82 MG/DL (ref 65–99)
HCT VFR BLD AUTO: 31.8 % (ref 34–46.6)
HCV AB S/CO SERPL IA: 0.1 S/CO RATIO (ref 0–0.9)
HDLC SERPL-MCNC: 111 MG/DL
HGB BLD-MCNC: 9.2 G/DL (ref 11.1–15.9)
IMM GRANULOCYTES # BLD AUTO: 0 X10E3/UL (ref 0–0.1)
IMM GRANULOCYTES NFR BLD AUTO: 0 %
IMP & REVIEW OF LAB RESULTS: NORMAL
LDLC SERPL CALC-MCNC: 79 MG/DL (ref 0–99)
LYMPHOCYTES # BLD AUTO: 1.4 X10E3/UL (ref 0.7–3.1)
LYMPHOCYTES NFR BLD AUTO: 28 %
MCH RBC QN AUTO: 21.7 PG (ref 26.6–33)
MCHC RBC AUTO-ENTMCNC: 28.9 G/DL (ref 31.5–35.7)
MCV RBC AUTO: 75 FL (ref 79–97)
MONOCYTES # BLD AUTO: 0.6 X10E3/UL (ref 0.1–0.9)
MONOCYTES NFR BLD AUTO: 11 %
NEUTROPHILS # BLD AUTO: 3.1 X10E3/UL (ref 1.4–7)
NEUTROPHILS NFR BLD AUTO: 59 %
PLATELET # BLD AUTO: 309 X10E3/UL (ref 150–450)
POTASSIUM SERPL-SCNC: 3.9 MMOL/L (ref 3.5–5.2)
PROT SERPL-MCNC: 6.8 G/DL (ref 6–8.5)
RBC # BLD AUTO: 4.24 X10E6/UL (ref 3.77–5.28)
SODIUM SERPL-SCNC: 142 MMOL/L (ref 134–144)
TRIGL SERPL-MCNC: 53 MG/DL (ref 0–149)
TSH SERPL DL<=0.005 MIU/L-ACNC: 1.18 UIU/ML (ref 0.45–4.5)
VLDLC SERPL CALC-MCNC: 10 MG/DL (ref 5–40)
WBC # BLD AUTO: 5.2 X10E3/UL (ref 3.4–10.8)

## 2021-08-28 NOTE — PROGRESS NOTES
Please advise that lab results are essentially normal except for the complete blood count which shows a mild iron deficiency type anemia. This is not changed compared with previous results. We can discuss this in greater detail when she returns for her physical exam appointment.

## 2021-08-31 ENCOUNTER — TELEPHONE (OUTPATIENT)
Dept: FAMILY MEDICINE CLINIC | Age: 41
End: 2021-08-31

## 2021-08-31 NOTE — PROGRESS NOTES
Informed pt of lab results and treatment plan, pt requested a copy of labs to be mailed. Will mail a copy of labs to pt. Pt stated that she will call back to schedule Physical appointment because she just started school.

## 2021-10-31 NOTE — PROGRESS NOTES
HISTORY OF PRESENT ILLNESS  Mona Barfield is a 46 y.o. female. She presents for health assessment, preventative care and follow-up with a history of diagnosis with invasive ductal carcinoma of the right breast in May 2018 subsequently treated with lumpectomy currently taking tamoxifen and microcytic anemia with a reported history of sickle trait/thalassemia  She was evaluated at the [facility] emergency department on 10/25/2021 reporting right upper quadrant pain. Evaluation there including abdominal ultrasound, abdominal pelvic CT and lab studies resulted in the conclusion that her symptoms were most likely associated with constipation. Incidental note was made of uterine fibroids, possible small left adrenal tumor, probable hepatic hemangioma, urinalysis consistent with hematuria apparently chronic based on chart review and hypokalemia not noted previously    Today she reports that her abdominal pain has resolved since she has taken the recommended medications and her constipation has resolved. She notes that she was advised about the unexplained hematuria several years ago and began urology evaluation but never completed it. She indicates that she does not recall having been referred for a CT urogram.  She reports that she has been told by different sources that her history is remarkable for sickle cell trait and or thalassemia but she is not sure.     Mr#: [id number]      Past Medical History:   Diagnosis Date    Anemia     Breast cancer ([facility]     Stage 1 Right breast - BRCA neg    Microcytic anemia 10/1/2018       Past Surgical History:   Procedure Laterality Date    OK BIOPSY OF BREAST, INCISIONAL      bilateral        Family History   Problem Relation Age of Onset    No Known Problems Mother     No Known Problems Father        No Known Allergies    Social History     Tobacco Use   Smoking Status Never Smoker   Smokeless Tobacco Never Used       Social History     Substance and Sexual Activity   Alcohol Use No         Patient Active Problem List   Diagnosis Code    Sickle-cell trait (HCC) D57.3    [facility] C50.919         Current Outpatient Medications:     ferrous sulfate, dried (Slow Release Iron) 168 mg (50 mg iron) TbER, Take  by mouth daily. , Disp: , Rfl:     tamoxifen (NOLVADEX) 20 mg tablet, Take 1 Tab by mouth daily. , Disp: , Rfl: 11       Review of Systems   Constitutional: Negative for chills, fever and weight loss. HENT: Negative for congestion, ear pain, hearing loss and sore throat. Eyes: Negative for blurred vision and double vision. Respiratory: Negative for cough, shortness of breath and wheezing. Cardiovascular: Negative for chest pain, palpitations and leg swelling. Gastrointestinal: Negative for abdominal pain, blood in stool, constipation, diarrhea, heartburn, melena, nausea and vomiting. Genitourinary: Negative for dysuria and urgency. PMDD   Musculoskeletal: Negative for joint pain and myalgias. Skin: Negative for itching and rash. Neurological: Negative for dizziness, tingling, sensory change, focal weakness and headaches. Focus not back to 100% since chemotherapy   Endo/Heme/Allergies: Positive for environmental allergies. Psychiatric/Behavioral: Negative for depression. The patient is not nervous/anxious and does not have insomnia. Visit Vitals  /80   Pulse 84   Temp 98.6 °F (37 °C) (Temporal)   Resp 16   Ht 5' 3\" (1.6 m)   Wt 167 lb (75.8 kg)   LMP 10/13/2021   SpO2 99%   BMI 29.58 kg/m²       Physical Exam  Vitals and nursing note reviewed. Constitutional:       General: She is not in acute distress. Appearance: Normal appearance. She is not ill-appearing. HENT:      Head: Normocephalic.       Right Ear: Tympanic membrane, ear canal and external ear normal.      Left Ear: Tympanic membrane, ear canal and external ear normal.   Eyes:      Extraocular Movements: Extraocular movements intact. Conjunctiva/sclera: Conjunctivae normal.      Pupils: Pupils are equal, round, and reactive to light. Neck:      Vascular: No carotid bruit. Cardiovascular:      Rate and Rhythm: Normal rate and regular rhythm. Heart sounds: Normal heart sounds. Pulmonary:      Effort: Pulmonary effort is normal.      Breath sounds: Normal breath sounds. Abdominal:      Palpations: Abdomen is soft. Tenderness: There is no abdominal tenderness. Musculoskeletal:         General: No deformity. Cervical back: Neck supple. Right lower leg: No edema. Left lower leg: No edema. Skin:     General: Skin is warm and dry. Neurological:      Mental Status: She is alert and oriented to person, place, and time. Psychiatric:         Mood and Affect: Mood normal.         Behavior: Behavior normal.         Thought Content: Thought content normal.     Results for Jordin Milian (MRN [id number]) as of 10/31/2021 12:45   Ref. Range 7/22/2010 18:15 7/21/2012 07:07 8/27/2021 00:00 10/25/2021 09:05   WBC Latest Ref Range: 4.0 - 11.0 1000/mm3 5.0 5.1 5.2 4.4   NRBC Latest Ref Range: 0 - 0      0   RBC Latest Ref Range: 3.60 - 5.20 M/uL 4.03 (L) 4.41 4.24 4.33   HGB Latest Ref Range: 13.0 - 17.2 gm/dl 9.7 (L) 10.2 (L) 9.2 (L) 9.5 (L)   HCT Latest Ref Range: 37.0 - 50.0 % 30.0 (L) 31.1 (L) 31.8 (L) 30.6 (L)   MCV Latest Ref Range: 80.0 - 98.0 fL 74.6 (L) 70.5 (L) 75 (L) 70.7 (L)   MCH Latest Ref Range: 25.4 - 34.6 pg 24.0 (L) 23.1 (L) 21.7 (L) 21.9 (L)   MCHC Latest Ref Range: 30.0 - 36.0 gm/dl 32.2 32.8 28.9 (L) 31.0   RDW Latest Ref Range: 11.7 - 15.4 % 15.1 (H) 13.7 15.5 (H)    RDW-SD Latest Ref Range: 36.4 - 46.3      40.5   PLATELET Latest Ref Range: 140 - 450 1000/mm3 350 332 309 291   MPV Latest Ref Range: 6.0 - 10.0 fL 7.6 10.1  10.2 (H)     Results for Jordin Milian (MRN [id number]) as of 10/31/2021 12:45   Ref.  Range 7/21/2012 05:37 10/3/2018 09:27 10/19/2018 11:21 10/25/2021 08:58   Color Latest Units: YELLOW   Yellow   Color (UA POC) Unknown  Yellow Yellow    Appearance Latest Units:   CLEAR   Clear   Clarity (UA POC) Unknown  Clear Clear    Specific gravity Latest Ref Range: 1.005 - 1.030   1.025   1.020   Specific gravity (UA POC) Latest Ref Range: 1.001 - 1.035   1.020 1.020    pH (UA) Latest Ref Range: 5 - 9   6.0   7.5   pH (UA POC) Latest Ref Range: 4.6 - 8.0   7.5 7.5    Protein Latest Ref Range: NEGATIVE,Negative mg/dl NEGATIVE   Negative   Protein (UA POC) Latest Ref Range: Negative   1+ 1+    Glucose Latest Ref Range: NEGATIVE,Negative mg/dl NEGATIVE   Negative   Glucose (UA POC) Latest Ref Range: Negative   Negative Negative    Ketone Latest Ref Range: NEGATIVE,Negative mg/dl NEGATIVE   Negative   Ketones (UA POC) Latest Ref Range: Negative   Negative Trace    Blood Latest Ref Range: NEGATIVE,Negative   LARGE (A)   Moderate (A)   Blood (UA POC) Latest Ref Range: Negative   3+ 3+    Bilirubin Latest Ref Range: NEGATIVE,Negative   NEGATIVE   Negative   Bilirubin (UA POC) Latest Ref Range: Negative   Negative Negative    Urobilinogen Latest Ref Range: 0.0 - 1.0 EU/dl 0.2   0.2   Urobilinogen (UA POC) Latest Ref Range: 0.2 - 1   0.2 mg/dL 0.2 mg/dL    Nitrites Latest Ref Range: NEGATIVE,Negative   NEGATIVE   Negative   Nitrites (UA POC) Latest Ref Range: Negative   Negative Negative    Leukocyte Esterase Latest Ref Range: NEGATIVE,Negative   NEGATIVE   Negative   Leukocyte esterase (UA POC) Latest Ref Range: Negative   Negative Negative    Epithelial cells Latest Ref Range: 0 - 5 /LPF FEW      WBC Latest Ref Range: 0 - 4 /HPF NONE      RBC Latest Ref Range: 0 - 5 /HPF 21 to 35      Results for Venetta Spatz (MRN [id number]) as of 10/31/2021 12:45   Ref.  Range 8/27/2021 00:00 10/25/2021 09:05   Sodium Latest Ref Range: 136 - 145 mEq/L 142 139   Potassium Latest Ref Range: 3.5 - 5.1 mEq/L 3.9 3.2 (L)   Chloride Latest Ref Range: 98 - 107 mEq/L 107 (H) 109 (H)   CO2 Latest Ref Range: 21 - 32 mEq/L 22 29   Anion gap Latest Ref Range: 5 - 15 mmol/L  1 (L)   Glucose Latest Ref Range: 74 - 106 mg/dl 82 87   BUN Latest Ref Range: 7 - 25 mg/dl 13 11   Creatinine Latest Ref Range: 0.6 - 1.3 mg/dl 0.70 0.6   BUN/Creatinine ratio Latest Ref Range: 9 - 23  19    Calcium Latest Ref Range: 8.5 - 10.1 mg/dl 9.0 8.4 (L)   GFR est non-AA Latest Units:   109 >60   GFR est AA Latest Units:   126 >60.0   Bilirubin, total Latest Ref Range: 0.2 - 1.0 mg/dl <0.2 0.6   Protein, total Latest Ref Range: 6.4 - 8.2 gm/dl 6.8 6.8   Albumin Latest Ref Range: 3.4 - 5.0 gm/dl 4.1 3.3 (L)   A-G Ratio Latest Ref Range: 1.2 - 2.2  1.5    ALT Latest Ref Range: 12 - 78 U/L 11 23   AST Latest Ref Range: 15 - 37 U/L 15 19   Alk. phosphatase Latest Ref Range: 45 - 117 U/L 35 (L) 30 (L)   Lipase Latest Ref Range: 73 - 393 U/L  48 (L)   Triglyceride Latest Ref Range: 0 - 149 mg/dL 53    Cholesterol, total Latest Ref Range: 100 - 199 mg/dL 200 (H)    HDL Cholesterol Latest Ref Range: >39 mg/dL 111    VLDL, calculated Latest Ref Range: 5 - 40 mg/dL 10    LDL, calculated Latest Ref Range: 0 - 99 mg/dL 79       ASSESSMENT and PLAN    ICD-10-CM ICD-9-CM    1. Routine general medical examination at a health care facility  Z00.00 V70.0    2. Personal history of breast cancer  Z85.3 V10.3    3. Microcytic anemia  D50.9 280.9 FERRITIN      IRON PROFILE   4. Sickle-cell trait ([facility]  D57.3 282.5    5. Adrenal tumor  D49.7 239.7 MRI ABD W CONT   6. Hematuria of undiagnosed cause  R31.9 599.70 CT UROGRAM W WO CONT   7. Hypokalemia  E92.3 250.0 METABOLIC PANEL, BASIC   8.  Needs flu shot  Z23 V04.81 INFLUENZA VIRUS VAC QUAD,SPLIT,PRESV FREE SYRINGE IM   Assessment:  History of invasive ductal carcinoma of the right breast followed by oncology and breast surgery with no evidence of recurrence  Microcytic anemia with a history of sickle cell trait and also fibroid uterus, hematuria  Unspecified left adrenal tumor  Hypokalemia    Health maintenance recommendations:  Influenza immunization given today    Plan:  BMP, ferritin, iron level, to follow-up on hypokalemia, microcytic anemia  CT urogram for further evaluation of persistent microscopic hematuria  Adrenal MRI for further evaluation of left adrenal tumor  Further disposition pending above results if indicated  Oncology/surgery follow-up as recommended  Return for annual physical exam and follow-up in 1 year or sooner with any problems    Addendum 12/3/2021:  Patient with chronic microscopic hematuria with previous plans for work-up never completed  Now with negative CT urogram  Incidentally reporting family history of unspecified kidney disease resulting in dialysis  Will refer for urology evaluation to complete work-up with possible cystoscopy and for recommendation about possible need for nephrology referral      Tonja Barnes MD      PLEASE NOTE:   This document has been produced using voice recognition software. Unrecognized errors in transcription may be present.

## 2021-11-01 ENCOUNTER — APPOINTMENT (OUTPATIENT)
Dept: FAMILY MEDICINE CLINIC | Age: 41
End: 2021-11-01

## 2021-11-01 ENCOUNTER — OFFICE VISIT (OUTPATIENT)
Dept: FAMILY MEDICINE CLINIC | Age: 41
End: 2021-11-01
Payer: MEDICAID

## 2021-11-01 VITALS
SYSTOLIC BLOOD PRESSURE: 110 MMHG | TEMPERATURE: 98.6 F | HEART RATE: 84 BPM | BODY MASS INDEX: 29.59 KG/M2 | DIASTOLIC BLOOD PRESSURE: 80 MMHG | OXYGEN SATURATION: 99 % | HEIGHT: 63 IN | WEIGHT: 167 LBS | RESPIRATION RATE: 16 BRPM

## 2021-11-01 DIAGNOSIS — Z85.3 PERSONAL HISTORY OF BREAST CANCER: ICD-10-CM

## 2021-11-01 DIAGNOSIS — D49.7 ADRENAL TUMOR: ICD-10-CM

## 2021-11-01 DIAGNOSIS — R31.9 HEMATURIA OF UNDIAGNOSED CAUSE: ICD-10-CM

## 2021-11-01 DIAGNOSIS — D50.9 MICROCYTIC ANEMIA: ICD-10-CM

## 2021-11-01 DIAGNOSIS — Z00.00 ROUTINE GENERAL MEDICAL EXAMINATION AT A HEALTH CARE FACILITY: Primary | ICD-10-CM

## 2021-11-01 DIAGNOSIS — Z23 NEEDS FLU SHOT: ICD-10-CM

## 2021-11-01 DIAGNOSIS — E87.6 HYPOKALEMIA: ICD-10-CM

## 2021-11-01 DIAGNOSIS — D57.3 SICKLE-CELL TRAIT (HCC): ICD-10-CM

## 2021-11-01 PROCEDURE — 99395 PREV VISIT EST AGE 18-39: CPT | Performed by: FAMILY MEDICINE

## 2021-11-01 PROCEDURE — 90686 IIV4 VACC NO PRSV 0.5 ML IM: CPT | Performed by: FAMILY MEDICINE

## 2021-11-01 PROCEDURE — 90471 IMMUNIZATION ADMIN: CPT | Performed by: FAMILY MEDICINE

## 2021-11-01 RX ORDER — LORAZEPAM 2 MG
TABLET ORAL DAILY
COMMUNITY
End: 2021-11-02 | Stop reason: SDUPTHER

## 2021-11-01 NOTE — PROGRESS NOTES
Raphael Santizo is a 46 y.o. female (: 1980) presenting to address:    Chief Complaint   Patient presents with    Complete Physical    Immunization/Injection     wants flu shot        Vitals:    21 0937   BP: 110/80   Pulse: 84   Resp: 16   Temp: 98.6 °F (37 °C)   TempSrc: Temporal   SpO2: 99%   Weight: 167 lb (75.8 kg)   Height: 5' 3\" (1.6 m)   PainSc:   2   PainLoc: Leg   LMP: 10/13/2021       Hearing/Vision:   No exam data present    Learning Assessment:   No flowsheet data found. Depression Screening:     3 most recent PHQ Screens 2021   PHQ Not Done -   Little interest or pleasure in doing things Not at all   Feeling down, depressed, irritable, or hopeless Not at all   Total Score PHQ 2 0     Fall Risk Assessment:     Fall Risk Assessment, last 12 mths 2021   Able to walk? Yes   Fall in past 12 months? 0   Do you feel unsteady? 0   Are you worried about falling 0     Abuse Screening:     Abuse Screening Questionnaire 2021   Do you ever feel afraid of your partner? N   Are you in a relationship with someone who physically or mentally threatens you? N   Is it safe for you to go home? Y     Coordination of Care Questionaire:   1. Have you been to the ER, urgent care clinic since your last visit? Hospitalized since your last visit? NO    2. Have you seen or consulted any other health care providers outside of the [address] [place] [address] since your last visit? Include any pap smears or colon screening. NO    Advanced Directive:   1. Do you have an Advanced Directive? NO    2. Would you like information on Advanced Directives? NO    Flu shot Immunization/s administered 2021 by Lashon Lane LPN with guardian's consent. Patient tolerated procedure well. No reactions noted.

## 2021-11-01 NOTE — PATIENT INSTRUCTIONS
BMP, ferritin, iron level, to follow-up on hypokalemia, microcytic anemia  CT urogram for further evaluation of persistent microscopic hematuria  Adrenal MRI for further evaluation of left adrenal tumor  Further disposition pending above results if indicated  Oncology/surgery follow-up as recommended  Return for annual physical exam and follow-up in 1 year or sooner with any problems

## 2021-11-02 DIAGNOSIS — D50.9 IRON DEFICIENCY ANEMIA, UNSPECIFIED IRON DEFICIENCY ANEMIA TYPE: Primary | ICD-10-CM

## 2021-11-02 LAB
BUN SERPL-MCNC: 9 MG/DL (ref 6–20)
BUN/CREAT SERPL: 12 (ref 9–23)
CALCIUM SERPL-MCNC: 9 MG/DL (ref 8.7–10.2)
CHLORIDE SERPL-SCNC: 104 MMOL/L (ref 96–106)
CO2 SERPL-SCNC: 20 MMOL/L (ref 20–29)
CREAT SERPL-MCNC: 0.77 MG/DL (ref 0.57–1)
FERRITIN SERPL-MCNC: 13 NG/ML (ref 15–150)
GLUCOSE SERPL-MCNC: 80 MG/DL (ref 65–99)
IRON SATN MFR SERPL: 26 % (ref 15–55)
IRON SERPL-MCNC: 101 UG/DL (ref 27–159)
POTASSIUM SERPL-SCNC: 3.8 MMOL/L (ref 3.5–5.2)
SODIUM SERPL-SCNC: 137 MMOL/L (ref 134–144)
TIBC SERPL-MCNC: 389 UG/DL (ref 250–450)
UIBC SERPL-MCNC: 288 UG/DL (ref 131–425)

## 2021-11-02 RX ORDER — LANOLIN ALCOHOL/MO/W.PET/CERES
325 CREAM (GRAM) TOPICAL
Qty: 90 TABLET | Refills: 1 | OUTPATIENT
Start: 2021-11-02

## 2021-11-02 RX ORDER — LORAZEPAM 2 MG
168 TABLET ORAL DAILY
Qty: 90 TABLET | Refills: 1 | Status: SHIPPED | OUTPATIENT
Start: 2021-11-02 | End: 2022-11-04

## 2021-11-02 NOTE — PROGRESS NOTES
Please advise Ms. Lauren Agustin that her lab results show that iron deficiency does play a role in her low blood count. This may be the result of heavy menstrual flow associated with her uterine fibroids and or the blood in her urine. If she has heavy menstrual flow it would be best if she discussed that with her gynecologist to see if any treatment of the uterine fibroids would be recommended. A work-up of the blood in her urine is underway. She should continue to take the iron pill that was prescribed yesterday.   The lab also showed that her potassium level is back to normal.

## 2021-11-15 ENCOUNTER — TELEPHONE (OUTPATIENT)
Dept: FAMILY MEDICINE CLINIC | Age: 41
End: 2021-11-15

## 2021-11-15 NOTE — TELEPHONE ENCOUNTER
Chrissy Marcus from [facility] called on behalf of the pt. Chrissy Marcus stated that the prior auth for the MRI for the abdomin is for [facility] not [facility]. Need to have that changed to [facility]. Pt is scheduled for MRI on the 18 th and they have to contact the pt by today or they will have to reschedule. NPI number is [id number] address is [place] [address]. If there are any questions please contact [facility] [phone].  Please advise

## 2021-11-16 NOTE — TELEPHONE ENCOUNTER
I called and notified Mak Dwyer has been updated and clarified order for Ct which should be a ct urogram not a ab/ pelvis . She will update this in the system .

## 2021-12-02 ENCOUNTER — TELEPHONE (OUTPATIENT)
Dept: FAMILY MEDICINE CLINIC | Age: 41
End: 2021-12-02

## 2021-12-03 NOTE — TELEPHONE ENCOUNTER
Referral faxed . I called patient and left message for her to call office to notify her and apologize for the delay .

## 2022-03-16 ENCOUNTER — OFFICE VISIT (OUTPATIENT)
Dept: FAMILY MEDICINE CLINIC | Age: 42
End: 2022-03-16
Payer: MEDICAID

## 2022-03-16 VITALS
WEIGHT: 177 LBS | OXYGEN SATURATION: 99 % | TEMPERATURE: 98.3 F | RESPIRATION RATE: 16 BRPM | HEART RATE: 82 BPM | DIASTOLIC BLOOD PRESSURE: 80 MMHG | SYSTOLIC BLOOD PRESSURE: 130 MMHG | BODY MASS INDEX: 31.36 KG/M2 | HEIGHT: 63 IN

## 2022-03-16 DIAGNOSIS — Z84.1 FAMILY HISTORY OF RENAL FAILURE: ICD-10-CM

## 2022-03-16 DIAGNOSIS — S29.011A INTERCOSTAL MUSCLE STRAIN, INITIAL ENCOUNTER: ICD-10-CM

## 2022-03-16 DIAGNOSIS — R03.0 ELEVATED BLOOD PRESSURE READING: Primary | ICD-10-CM

## 2022-03-16 DIAGNOSIS — N02.9 BENIGN ESSENTIAL HEMATURIA: ICD-10-CM

## 2022-03-16 PROCEDURE — 99214 OFFICE O/P EST MOD 30 MIN: CPT | Performed by: FAMILY MEDICINE

## 2022-03-16 NOTE — PROGRESS NOTES
Rodríguez Berumen is a 46 y.o. female (: 1980) presenting to address:    Chief Complaint   Patient presents with    Blood Pressure Check     thinks it was due to antidepressant so she stopped .  Rib Pain     right side        Vitals:    22 1536   BP: 130/80   Pulse: 82   Resp: 16   Temp: 98.3 °F (36.8 °C)   TempSrc: Temporal   SpO2: 99%   Weight: 177 lb (80.3 kg)   Height: 5' 3\" (1.6 m)   PainSc:   5   PainLoc: Rib Cage   LMP: 2022       Hearing/Vision:   No exam data present    Learning Assessment:   No flowsheet data found. Depression Screening:     3 most recent PHQ Screens 2021   PHQ Not Done -   Little interest or pleasure in doing things Not at all   Feeling down, depressed, irritable, or hopeless Not at all   Total Score PHQ 2 0     Fall Risk Assessment:     Fall Risk Assessment, last 12 mths 2021   Able to walk? Yes   Fall in past 12 months? 0   Do you feel unsteady? 0   Are you worried about falling 0     Abuse Screening:     Abuse Screening Questionnaire 2021   Do you ever feel afraid of your partner? N   Are you in a relationship with someone who physically or mentally threatens you? N   Is it safe for you to go home? Y     ADL Assessment:   No flowsheet data found. Coordination of Care Questionaire:   1. \"Have you been to the ER, urgent care clinic since your last visit? Hospitalized since your last visit? \" No    2. \"Have you seen or consulted any other health care providers outside of the [address] [place] [address] since your last visit? \" yes urology     3. For patients aged 39-70: Has the patient had a colonoscopy? NA - based on age     If the patient is female:    4. For patients aged 41-77: Has the patient had a mammogram within the past 2 years? Yes - no Care Gap present    5. For patients aged 21-65: Has the patient had a pap smear? Yes - no Care Gap present    Advanced Directive:   1. Do you have an Advanced Directive? NO    2.  Would you like information on Advanced Directives?  NO

## 2022-03-16 NOTE — PATIENT INSTRUCTIONS
Apply warm wet compresses to area of right chest wall tenderness, avoid painful activity  Check with family/nephrologist providing care for the name of the cause for renal failure such as Bright disease or Alport's disease or another type of glomerulonephritis  Schedule lab appointment followed by annual physical exam appointment after 11/1/2022

## 2022-03-16 NOTE — PROGRESS NOTES
HISTORY OF PRESENT ILLNESS  Armando Holm is a 46 y.o. female. Ms. Les Lemus reports concern because of an elevated blood pressure reading while at an urgent. Apparently the blood pressure was 140-150/90. She is not aware of any personal or family history of hypertension. She was prescribed an antidepressant by her oncologist where she is followed for her history of breast cancer. She became concerned that the venlafaxine SR 37.5 mg she was taking might be the source of her elevated blood pressure and discontinued it. She did experience some withdrawal symptoms for a few days. She also notes a recent onset of right lower chest wall discomfort exacerbated by movement. Finally she has completed her work-up for unexplained microscopic hematuria with a cystoscopy. The CT urogram and cystoscopy were without abnormal findings. She reports however that she has 2 family members on dialysis reasons unknown to her. Review of Systems   Constitutional: Negative for chills and fever. Respiratory: Negative for shortness of breath. Cardiovascular: Negative for chest pain, palpitations and leg swelling. Genitourinary: Negative for dysuria and urgency. Musculoskeletal:        Right chest wall pain, see HPI   Psychiatric/Behavioral: Positive for depression ( Not currently acutely aware of depression symptoms but has only been off the antidepressant for short period of time). Negative for suicidal ideas. Physical Exam  Vitals and nursing note reviewed. Constitutional:       General: She is not in acute distress. Appearance: Normal appearance. She is well-developed. She is not ill-appearing. HENT:      Head: Normocephalic. Eyes:      Extraocular Movements: Extraocular movements intact. Conjunctiva/sclera: Conjunctivae normal.   Cardiovascular:      Rate and Rhythm: Normal rate and regular rhythm. Heart sounds: Normal heart sounds.       Comments: Currently normotensive  Pulmonary: Effort: Pulmonary effort is normal.      Breath sounds: Normal breath sounds. Chest:      Chest wall: Tenderness ( Mild intercostal tenderness distal right thorax) present. Musculoskeletal:      Cervical back: Neck supple. Skin:     General: Skin is warm and dry. Neurological:      Mental Status: She is alert and oriented to person, place, and time. Psychiatric:         Mood and Affect: Mood normal.         Behavior: Behavior normal.         Thought Content: Thought content normal.       Cystoscopy report reviewed  ASSESSMENT and PLAN    ICD-10-CM ICD-9-CM    1. Elevated blood pressure reading  R03.0 796.2    2. Intercostal muscle strain, initial encounter  S29.011A 848.3    3. Benign essential hematuria  N02.9 599.70    4. Family history of renal failure  Z84.1 V18.69    Assessment:  Elevated blood pressure reading without diagnosis of hypertension, reassured advised that it would seem unlikely that venlafaxine was the etiology of her elevated blood pressure and encouraged to reconsider taking an antidepressant if needed  Assured that there are many people who end up with benign essential hematuria after an appropriate work-up however in view of her reported family history it does warrant investigation into that did see if these family members had a recognizable inheritable condition. Health maintenance recommendations:   COVID-19 immunization booster  Tdap immunization    Plan:  Apply warm wet compresses to area of right chest wall tenderness, avoid painful activity  Check with family/nephrologist providing care for the name of the cause for renal failure such as Bright disease or Alport's disease or another type of glomerulonephritis  Schedule lab appointment followed by annual physical exam appointment after 11/1/2022    Des Mullins MD      PLEASE NOTE:   This document has been produced using voice recognition software. Unrecognized errors in transcription may be present.

## 2022-03-19 PROBLEM — D57.3 SICKLE-CELL TRAIT (HCC): Status: ACTIVE | Noted: 2018-06-19

## 2022-03-20 PROBLEM — C50.919 DUCTAL CARCINOMA OF BREAST (HCC): Status: ACTIVE | Noted: 2020-01-27

## 2022-08-09 DIAGNOSIS — N02.9 BENIGN ESSENTIAL HEMATURIA: Primary | ICD-10-CM

## 2022-08-09 DIAGNOSIS — D50.9 MICROCYTIC ANEMIA: ICD-10-CM

## 2022-11-04 ENCOUNTER — OFFICE VISIT (OUTPATIENT)
Dept: FAMILY MEDICINE CLINIC | Age: 42
End: 2022-11-04
Payer: MEDICAID

## 2022-11-04 ENCOUNTER — APPOINTMENT (OUTPATIENT)
Dept: FAMILY MEDICINE CLINIC | Age: 42
End: 2022-11-04

## 2022-11-04 VITALS
OXYGEN SATURATION: 98 % | BODY MASS INDEX: 34.02 KG/M2 | SYSTOLIC BLOOD PRESSURE: 110 MMHG | RESPIRATION RATE: 16 BRPM | HEART RATE: 84 BPM | WEIGHT: 192 LBS | DIASTOLIC BLOOD PRESSURE: 84 MMHG | TEMPERATURE: 98 F | HEIGHT: 63 IN

## 2022-11-04 DIAGNOSIS — Z85.3 PERSONAL HISTORY OF BREAST CANCER: ICD-10-CM

## 2022-11-04 DIAGNOSIS — N02.9 BENIGN ESSENTIAL HEMATURIA: ICD-10-CM

## 2022-11-04 DIAGNOSIS — I10 PRIMARY HYPERTENSION: ICD-10-CM

## 2022-11-04 DIAGNOSIS — Z84.1 FAMILY HISTORY OF RENAL FAILURE: ICD-10-CM

## 2022-11-04 DIAGNOSIS — D50.9 MICROCYTIC ANEMIA: ICD-10-CM

## 2022-11-04 DIAGNOSIS — Z00.00 ROUTINE GENERAL MEDICAL EXAMINATION AT A HEALTH CARE FACILITY: Primary | ICD-10-CM

## 2022-11-04 PROCEDURE — 3074F SYST BP LT 130 MM HG: CPT | Performed by: FAMILY MEDICINE

## 2022-11-04 PROCEDURE — 99396 PREV VISIT EST AGE 40-64: CPT | Performed by: FAMILY MEDICINE

## 2022-11-04 PROCEDURE — 3078F DIAST BP <80 MM HG: CPT | Performed by: FAMILY MEDICINE

## 2022-11-04 RX ORDER — LANOLIN ALCOHOL/MO/W.PET/CERES
325 CREAM (GRAM) TOPICAL DAILY
COMMUNITY
Start: 2022-09-19

## 2022-11-04 RX ORDER — AMLODIPINE BESYLATE 5 MG/1
5 TABLET ORAL DAILY
COMMUNITY
Start: 2022-10-21 | End: 2022-11-04 | Stop reason: SDUPTHER

## 2022-11-04 RX ORDER — AMLODIPINE BESYLATE 5 MG/1
5 TABLET ORAL DAILY
Qty: 90 TABLET | Refills: 3 | Status: SHIPPED | OUTPATIENT
Start: 2022-11-04

## 2022-11-04 NOTE — PROGRESS NOTES
HISTORY OF PRESENT ILLNESS  Lisandro Yeung is a 38 y.o. female. She presents for health assessment, preventative care and follow-up with a history of diagnosis with invasive ductal carcinoma of the right breast in May 2018 subsequently treated with lumpectomy currently taking tamoxifen and microcytic anemia with a reported history of sickle trait/thalassemia. She has previously reported a family history of renal failure with 2 family members treated with dialysis. Today she notes that she has discussed this with her mother who indicates that she has been advised that there is no specific diagnosis other than unexplained renal failure. The patient reports that she has been seen in urgent care for a Workmen's Comp. injury and it was incidentally noted that her blood pressure was elevated, she continue to monitor it at home and it remained elevated. She was subsequently started on amlodipine 5 mg daily and has been doing well with that since. She is aware that her weight gain likely has a negative impact on her blood pressure. Mr#: [id number]      Past Medical History:   Diagnosis Date    Anemia     Bre[facility]     Stage 1 Right breast - BRCA neg    Microcytic anemia 10/1/2018       Past Surgical History:   Procedure Laterality Date    VT BIOPSY OF BREAST, INCISIONAL      bilateral        Family History   Problem Relation Age of Onset    No Known Problems Mother     No Known Problems Father        No Known Allergies    Social History     Tobacco Use   Smoking Status Never   Smokeless Tobacco Never       Social History     Substance and Sexual Activity   Alcohol Use No         Patient Active Problem List   Diagnosis Code    Sickle-cell trait (HCC) D57.3    Ductal carcinoma of breast ([facility] C50.919         Current Outpatient Medications:     ferrous sulfate 325 mg (65 mg iron) tablet, Take 325 mg by mouth daily. , Disp: , Rfl:     amLODIPine (NORVASC) 5 mg tablet, Take 5 mg by mouth daily. , Disp: , Rfl: multivitamin (ONE A DAY) tablet, Take 1 Tablet by mouth daily. , Disp: , Rfl:     tamoxifen (NOLVADEX) 20 mg tablet, Take 1 Tab by mouth daily. , Disp: , Rfl: 11       Review of Systems   Constitutional:  Negative for chills, fever and weight loss. HENT:  Negative for congestion, ear pain, hearing loss and sore throat. Eyes:  Negative for blurred vision and double vision. Respiratory:  Negative for cough, shortness of breath and wheezing. Cardiovascular:  Negative for chest pain, palpitations and leg swelling. Gastrointestinal:  Negative for abdominal pain, blood in stool, constipation, diarrhea, heartburn, melena, nausea and vomiting. Genitourinary:  Negative for dysuria and urgency. Pain with intercourse-plans to see her gynecologist   Musculoskeletal:  Negative for joint pain and myalgias. Skin:  Negative for itching and rash. Neurological:  Negative for dizziness, tingling, sensory change, focal weakness and headaches. Endo/Heme/Allergies:  Positive for environmental allergies. Psychiatric/Behavioral:  Negative for depression. The patient is not nervous/anxious and does not have insomnia. Visit Vitals  /84 (BP 1 Location: Left upper arm, BP Patient Position: Sitting, BP Cuff Size: Large adult)   Pulse 84   Temp 98 °F (36.7 °C) (Temporal)   Resp 16   Ht 5' 3\" (1.6 m)   Wt 192 lb (87.1 kg)   SpO2 98%   BMI 34.01 kg/m²       Physical Exam  Vitals and nursing note reviewed. Constitutional:       General: She is not in acute distress. Appearance: Normal appearance. She is obese. She is not ill-appearing. Comments: 15 pound weight gain in the past 8 months   HENT:      Head: Normocephalic. Right Ear: Tympanic membrane, ear canal and external ear normal.      Left Ear: Tympanic membrane, ear canal and external ear normal.      Mouth/Throat:      Mouth: Mucous membranes are moist.      Pharynx: Oropharynx is clear.    Eyes:      Extraocular Movements: Extraocular movements intact. Conjunctiva/sclera: Conjunctivae normal.      Pupils: Pupils are equal, round, and reactive to light. Neck:      Vascular: No carotid bruit. Cardiovascular:      Rate and Rhythm: Normal rate and regular rhythm. Heart sounds: Normal heart sounds. Pulmonary:      Effort: Pulmonary effort is normal.      Breath sounds: Normal breath sounds. Abdominal:      Palpations: Abdomen is soft. Tenderness: There is no abdominal tenderness. Musculoskeletal:         General: No deformity. Cervical back: Neck supple. Right lower leg: No edema. Left lower leg: No edema. Skin:     General: Skin is warm and dry. Neurological:      Mental Status: She is alert and oriented to person, place, and time. Psychiatric:         Mood and Affect: Mood normal.         Behavior: Behavior normal.       ASSESSMENT and PLAN    ICD-10-CM ICD-9-CM    1. Routine general medical examination at a health care facility  Z00.00 V70.0       2. Personal history of breast cancer  Z85.3 V10.3       3. Microcytic anemia  D50.9 280.9       4. Family history of renal failure  Z84.1 V18.69       5. Primary hypertension  I10 401.9 amLODIPine (NORVASC) 5 mg tablet      Assessment:  Personal history of breast cancer followed by surgery and oncology  History of stable microcytic anemia with a reported history of sickle trait/thalassemia  Family history of renal failure, specific diagnoses?     Health maintenance recommendations:  COVID-19 immunization with bivalent vaccine  Influenza immunization-completed    Plan:  Obtain previously ordered lab studies with further disposition pending results if indicated  Please attempt to find out diagnosis regarding family members with renal failure  Avoid dietary starch and sugar and follow program of regular aerobic exercise  Return for annual physical exam and follow-up in 1 year or sooner with any problems  Please always arrive at least 15 minutes before your scheduled appointment time  Sergio Galvan MD      PLEASE NOTE:   This document has been produced using voice recognition software. Unrecognized errors in transcription may be present.

## 2022-11-04 NOTE — PROGRESS NOTES
Sophy Dudley is a 38 y.o. female (: 1980) presenting to address:    Chief Complaint   Patient presents with    Complete Physical     Follow up       Vitals:    22 1505   BP: 110/84   Pulse: 84   Resp: 16   Temp: 98 °F (36.7 °C)   TempSrc: Temporal   SpO2: 98%   Weight: 192 lb (87.1 kg)   Height: 5' 3\" (1.6 m)       Hearing/Vision:   No results found. Learning Assessment:   No flowsheet data found. Depression Screening:     3 most recent PHQ Screens 2022   PHQ Not Done -   Little interest or pleasure in doing things Not at all   Feeling down, depressed, irritable, or hopeless Not at all   Total Score PHQ 2 0     Fall Risk Assessment:     Fall Risk Assessment, last 12 mths 2021   Able to walk? Yes   Fall in past 12 months? 0   Do you feel unsteady? 0   Are you worried about falling 0     Abuse Screening:     Abuse Screening Questionnaire 2021   Do you ever feel afraid of your partner? N   Are you in a relationship with someone who physically or mentally threatens you? N   Is it safe for you to go home? Y     ADL Assessment:   No flowsheet data found. Coordination of Care Questionaire:   1. \"Have you been to the ER, urgent care clinic since your last visit? Hospitalized since your last visit? \" Yes Where: [facility] a month a go. Went for something job related and gave her Norvasc    2. \"Have you seen or consulted any other health care providers outside of the [address] [place] [address] since your last visit? \" Yes Where: Oncologist  ?      3. For patients aged 39-70: Has the patient had a colonoscopy? NA - based on age     If the patient is female:    4. For patients aged 41-77: Has the patient had a mammogram within the past 2 years? Yes - no Care Gap present    5. For patients aged 21-65: Has the patient had a pap smear? Yes - no Care Gap present    Advanced Directive:   1. Do you have an Advanced Directive? NO    2. Would you like information on Advanced Directives?  NO

## 2022-11-04 NOTE — PATIENT INSTRUCTIONS
Assessment:  Personal history of breast cancer followed by surgery and oncology  History of stable microcytic anemia with a reported history of sickle trait/thalassemia  Family history of renal failure, specific diagnoses?     Health maintenance recommendations:  COVID-19 immunization with bivalent vaccine  Influenza immunization-completed    Plan:  Obtain previously ordered lab studies with further disposition pending results if indicated  Please attempt to find out diagnosis regarding family members with renal failure  Avoid dietary starch and sugar and follow program of regular aerobic exercise  Return for annual physical exam and follow-up in 1 year or sooner with any problems  Please always arrive at least 15 minutes before your scheduled appointment time

## 2022-11-05 LAB
ALBUMIN SERPL-MCNC: 4.1 G/DL (ref 3.8–4.8)
ALBUMIN/GLOB SERPL: 1.6 {RATIO} (ref 1.2–2.2)
ALP SERPL-CCNC: 46 IU/L (ref 44–121)
ALT SERPL-CCNC: 16 IU/L (ref 0–32)
APPEARANCE UR: CLEAR
AST SERPL-CCNC: 19 IU/L (ref 0–40)
BACTERIA #/AREA URNS HPF: ABNORMAL /[HPF]
BASOPHILS # BLD AUTO: 0.1 X10E3/UL (ref 0–0.2)
BASOPHILS NFR BLD AUTO: 1 %
BILIRUB SERPL-MCNC: <0.2 MG/DL (ref 0–1.2)
BILIRUB UR QL STRIP: NEGATIVE
BUN SERPL-MCNC: 10 MG/DL (ref 6–24)
BUN/CREAT SERPL: 14 (ref 9–23)
CALCIUM SERPL-MCNC: 8.8 MG/DL (ref 8.7–10.2)
CASTS URNS QL MICRO: ABNORMAL /LPF
CHLORIDE SERPL-SCNC: 105 MMOL/L (ref 96–106)
CHOLEST SERPL-MCNC: 204 MG/DL (ref 100–199)
CO2 SERPL-SCNC: 26 MMOL/L (ref 20–29)
COLOR UR: YELLOW
CREAT SERPL-MCNC: 0.7 MG/DL (ref 0.57–1)
EGFR: 112 ML/MIN/1.73
EOSINOPHIL # BLD AUTO: 0.1 X10E3/UL (ref 0–0.4)
EOSINOPHIL NFR BLD AUTO: 2 %
EPI CELLS #/AREA URNS HPF: ABNORMAL /HPF (ref 0–10)
ERYTHROCYTE [DISTWIDTH] IN BLOOD BY AUTOMATED COUNT: 17.7 % (ref 11.7–15.4)
GLOBULIN SER CALC-MCNC: 2.5 G/DL (ref 1.5–4.5)
GLUCOSE SERPL-MCNC: 99 MG/DL (ref 70–99)
GLUCOSE UR QL STRIP: NEGATIVE
HCT VFR BLD AUTO: 32.8 % (ref 34–46.6)
HDLC SERPL-MCNC: 117 MG/DL
HGB BLD-MCNC: 9.8 G/DL (ref 11.1–15.9)
HGB UR QL STRIP: ABNORMAL
IMM GRANULOCYTES # BLD AUTO: 0 X10E3/UL (ref 0–0.1)
IMM GRANULOCYTES NFR BLD AUTO: 0 %
IMP & REVIEW OF LAB RESULTS: NORMAL
KETONES UR QL STRIP: NEGATIVE
LDLC SERPL CALC-MCNC: 76 MG/DL (ref 0–99)
LEUKOCYTE ESTERASE UR QL STRIP: NEGATIVE
LYMPHOCYTES # BLD AUTO: 1.8 X10E3/UL (ref 0.7–3.1)
LYMPHOCYTES NFR BLD AUTO: 26 %
MCH RBC QN AUTO: 21.4 PG (ref 26.6–33)
MCHC RBC AUTO-ENTMCNC: 29.9 G/DL (ref 31.5–35.7)
MCV RBC AUTO: 72 FL (ref 79–97)
MICRO URNS: ABNORMAL
MONOCYTES # BLD AUTO: 0.8 X10E3/UL (ref 0.1–0.9)
MONOCYTES NFR BLD AUTO: 11 %
NEUTROPHILS # BLD AUTO: 4.3 X10E3/UL (ref 1.4–7)
NEUTROPHILS NFR BLD AUTO: 60 %
NITRITE UR QL STRIP: NEGATIVE
PH UR STRIP: 7.5 [PH] (ref 5–7.5)
PLATELET # BLD AUTO: 359 X10E3/UL (ref 150–450)
POTASSIUM SERPL-SCNC: 3.5 MMOL/L (ref 3.5–5.2)
PROT SERPL-MCNC: 6.6 G/DL (ref 6–8.5)
PROT UR QL STRIP: ABNORMAL
RBC # BLD AUTO: 4.58 X10E6/UL (ref 3.77–5.28)
RBC #/AREA URNS HPF: >30 /HPF (ref 0–2)
SODIUM SERPL-SCNC: 141 MMOL/L (ref 134–144)
SP GR UR STRIP: 1.02 (ref 1–1.03)
TRIGL SERPL-MCNC: 57 MG/DL (ref 0–149)
UROBILINOGEN UR STRIP-MCNC: 0.2 MG/DL (ref 0.2–1)
VLDLC SERPL CALC-MCNC: 11 MG/DL (ref 5–40)
WBC # BLD AUTO: 7.1 X10E3/UL (ref 3.4–10.8)
WBC #/AREA URNS HPF: ABNORMAL /HPF (ref 0–5)

## 2022-11-06 NOTE — PROGRESS NOTES
These advise Ms. Nely Johns that her lab results are good, it has been noted that she still has a mild anemia and evidence of microscopic hematuria both of which have previously been evaluated with no indication of any need for intervention.

## 2022-12-14 NOTE — ADDENDUM NOTE
Addended by: Marleny Pantoja on: 11/2/2021 03:41 PM     Modules accepted: Orders Dose Administered (Numbers Only): 6

## 2023-01-04 ENCOUNTER — OFFICE VISIT (OUTPATIENT)
Dept: FAMILY MEDICINE CLINIC | Age: 43
End: 2023-01-04
Payer: MEDICAID

## 2023-01-04 VITALS
RESPIRATION RATE: 16 BRPM | HEART RATE: 92 BPM | OXYGEN SATURATION: 97 % | HEIGHT: 63 IN | WEIGHT: 196 LBS | DIASTOLIC BLOOD PRESSURE: 80 MMHG | BODY MASS INDEX: 34.73 KG/M2 | SYSTOLIC BLOOD PRESSURE: 120 MMHG | TEMPERATURE: 98.2 F

## 2023-01-04 DIAGNOSIS — F41.9 ANXIETY: ICD-10-CM

## 2023-01-04 DIAGNOSIS — Z85.3 PERSONAL HISTORY OF BREAST CANCER: ICD-10-CM

## 2023-01-04 DIAGNOSIS — R19.8 SYMPTOMS OF GASTROESOPHAGEAL REFLUX: ICD-10-CM

## 2023-01-04 DIAGNOSIS — K58.2 IRRITABLE BOWEL SYNDROME WITH BOTH CONSTIPATION AND DIARRHEA: Primary | ICD-10-CM

## 2023-01-04 PROCEDURE — 99214 OFFICE O/P EST MOD 30 MIN: CPT | Performed by: FAMILY MEDICINE

## 2023-01-04 RX ORDER — SERTRALINE HYDROCHLORIDE 25 MG/1
25 TABLET, FILM COATED ORAL DAILY
Qty: 30 TABLET | Refills: 1 | Status: SHIPPED | OUTPATIENT
Start: 2023-01-04

## 2023-01-04 RX ORDER — FAMOTIDINE 40 MG/1
40 TABLET, FILM COATED ORAL DAILY
Qty: 90 TABLET | Refills: 1 | Status: SHIPPED | OUTPATIENT
Start: 2023-01-04

## 2023-01-04 NOTE — PATIENT INSTRUCTIONS
Assessment:  Symptoms suggestive of irritable bowel syndrome with alternating constipation and diarrhea  Anxiety exacerbated by situational stress    Plan:  Follow irritable bowel guidelines, avoid caffeine  Begin sertraline 25 mg daily in the morning  Begin famotidine 40 mg daily in the morning to help prevent reflux symptoms, avoid eating within 3 hours of bedtime  Return for follow-up in 3-4 weeks or sooner with any problems  Please always arrive at least 15 minutes before your scheduled appointment time

## 2023-01-04 NOTE — PROGRESS NOTES
Byron Campos is a 38 y.o. female (: 1980) presenting to address:    Chief Complaint   Patient presents with    Constipation       Vitals:    23 0943   BP: 120/80   Pulse: 92   Resp: 16   Temp: 98.2 °F (36.8 °C)   TempSrc: Temporal   SpO2: 97%   Weight: 196 lb (88.9 kg)   Height: 5' 3\" (1.6 m)   PainSc:   0 - No pain   LMP: 2022       Hearing/Vision:   No results found. Learning Assessment:   No flowsheet data found. Depression Screening:     3 most recent PHQ Screens 2023   PHQ Not Done -   Little interest or pleasure in doing things Not at all   Feeling down, depressed, irritable, or hopeless Not at all   Total Score PHQ 2 0     Fall Risk Assessment:     Fall Risk Assessment, last 12 mths 2021   Able to walk? Yes   Fall in past 12 months? 0   Do you feel unsteady? 0   Are you worried about falling 0     Abuse Screening:     Abuse Screening Questionnaire 2021   Do you ever feel afraid of your partner? N   Are you in a relationship with someone who physically or mentally threatens you? N   Is it safe for you to go home? Y     ADL Assessment:   No flowsheet data found. Coordination of Care Questionaire:   1. \"Have you been to the ER, urgent care clinic since your last visit? Hospitalized since your last visit? \" No    2. \"Have you seen or consulted any other health care providers outside of the [address] [place] [address] since your last visit? \" No     3. For patients aged 39-70: Has the patient had a colonoscopy? NA - based on age     If the patient is female:    4. For patients aged 41-77: Has the patient had a mammogram within the past 2 years? Yes - no Care Gap present    5. For patients aged 21-65: Has the patient had a pap smear? Yes - no Care Gap present    Advanced Directive:   1. Do you have an Advanced Directive? NO    2. Would you like information on Advanced Directives?  NO

## 2023-01-25 NOTE — PROGRESS NOTES
HISTORY OF PRESENT ILLNESS  Gorge Jean is a 38 y.o. female. She returns for follow-up after evaluation 3 weeks ago regarding symptoms of anxiety, irritable bowel symptoms, gastroesophageal reflux with a history of primary hypertension and carcinoma the breast.  The encounter resulted in the following assessment and plan:    Assessment:  Symptoms suggestive of irritable bowel syndrome with alternating constipation and diarrhea  Anxiety exacerbated by situational stress  Symptoms consistent with gastroesophageal reflux    Today she reports that she was unable to tolerate sertraline because of severe fatigue and drowsiness. She has not modified her work organization and has found that that has significantly decreased her stress and anxiety. She reports that her irritable bowel symptoms have improved significantly since she has adjusted her diet. This is also helped with reflux symptoms as has famotidine. She notes that she has been exercising more regularly and has been successful with some weight loss.   History of breast cancer    Plan:  Follow irritable bowel guidelines, avoid caffeine  Begin sertraline 25 mg daily in the morning  Begin famotidine 40 mg daily in the morning to help prevent reflux symptoms, avoid eating within 3 hours of bedtime  Return for follow-up in 3-4 weeks or sooner with any problems    Mr#: [id number]      Past Medical History:   Diagnosis Date    Anemia     Breast cancer ([facility] [address]     Stage 1 Right breast - BRCA neg    Microcytic anemia 10/1/2018       Past Surgical History:   Procedure Laterality Date    MI BIOPSY BREAST OPEN INCISIONAL      bilateral        Family History   Problem Relation Age of Onset    No Known Problems Mother     No Known Problems Father        No Known Allergies    Social History     Tobacco Use   Smoking Status Never   Smokeless Tobacco Never       Social History     Substance and Sexual Activity   Alcohol Use No         Patient Active Problem List   Diagnosis Code    Sickle-cell trait ([facility] [place] [facility] D57.3    Ductal carcinoma of breast ([facility] [place] [facility] C50.919         Current Outpatient Medications:     famotidine (PEPCID) 40 mg tablet, Take 1 Tablet by mouth daily. , Disp: 90 Tablet, Rfl: 1    amLODIPine (NORVASC) 5 mg tablet, Take 1 Tablet by mouth daily. , Disp: 90 Tablet, Rfl: 3    multivitamin (ONE A DAY) tablet, Take 1 Tablet by mouth daily. , Disp: , Rfl:     tamoxifen (NOLVADEX) 20 mg tablet, Take 1 Tablet by mouth daily. Holding med for now, Disp: , Rfl: 11       Review of Systems   Constitutional:  Negative for fever and weight loss. Gastrointestinal:  Positive for constipation, diarrhea and heartburn. Psychiatric/Behavioral:  The patient is nervous/anxious. Visit Vitals  BP (!) 120/90   Pulse 82   Temp 98.2 °F (36.8 °C) (Temporal)   Resp 16   Ht 5' 3\" (1.6 m)   Wt 190 lb (86.2 kg)   LMP 01/18/2023   SpO2 98%   BMI 33.66 kg/m²       Physical Exam  Vitals and nursing note reviewed. Constitutional:       General: She is not in acute distress. Appearance: Normal appearance. She is well-developed. She is obese. She is not ill-appearing. HENT:      Head: Normocephalic. Eyes:      Extraocular Movements: Extraocular movements intact. Cardiovascular:      Rate and Rhythm: Normal rate. Pulmonary:      Effort: Pulmonary effort is normal.   Musculoskeletal:      Cervical back: Neck supple. Neurological:      Mental Status: She is alert and oriented to person, place, and time. Psychiatric:         Mood and Affect: Mood normal.         Behavior: Behavior normal.       ASSESSMENT and PLAN    ICD-10-CM ICD-9-CM    1. Anxiety  F41.9 300.00       2. Irritable bowel syndrome with both constipation and diarrhea  K58.2 564.1       3. Symptoms of gastroesophageal reflux  R19.8 787.99       4. Primary hypertension  I10 401.9       5.  Personal history of breast cancer  Z85.3 V10.3       Assessment:  Anxiety symptoms better with change in work organization, unable to tolerate sertraline  Irritable bowel symptoms improved with dietary modification  Reflux symptoms improved with dietary modification and famotidine  Hypertension-mild diastolic elevation today    Plan:  Continue current medications  Avoid dietary caffeine, salt, starch and sugar and follow program of regular aerobic exercise  Hematology oncology follow-up as recommended by the consultant  Return for follow-up in about 6 weeks or sooner with any problems  Please always arrive at least 15 minutes before your scheduled appointment time  Dayton Garcia MD      PLEASE NOTE:   This document has been produced using voice recognition software. Unrecognized errors in transcription may be present.

## 2023-01-26 ENCOUNTER — OFFICE VISIT (OUTPATIENT)
Dept: FAMILY MEDICINE CLINIC | Age: 43
End: 2023-01-26
Payer: MEDICAID

## 2023-01-26 VITALS
TEMPERATURE: 98.2 F | OXYGEN SATURATION: 98 % | HEART RATE: 82 BPM | WEIGHT: 190 LBS | BODY MASS INDEX: 33.66 KG/M2 | HEIGHT: 63 IN | RESPIRATION RATE: 16 BRPM | SYSTOLIC BLOOD PRESSURE: 120 MMHG | DIASTOLIC BLOOD PRESSURE: 90 MMHG

## 2023-01-26 DIAGNOSIS — Z85.3 PERSONAL HISTORY OF BREAST CANCER: ICD-10-CM

## 2023-01-26 DIAGNOSIS — R19.8 SYMPTOMS OF GASTROESOPHAGEAL REFLUX: ICD-10-CM

## 2023-01-26 DIAGNOSIS — F41.9 ANXIETY: Primary | ICD-10-CM

## 2023-01-26 DIAGNOSIS — I10 PRIMARY HYPERTENSION: ICD-10-CM

## 2023-01-26 DIAGNOSIS — K58.2 IRRITABLE BOWEL SYNDROME WITH BOTH CONSTIPATION AND DIARRHEA: ICD-10-CM

## 2023-01-26 PROCEDURE — 3074F SYST BP LT 130 MM HG: CPT | Performed by: FAMILY MEDICINE

## 2023-01-26 PROCEDURE — 99213 OFFICE O/P EST LOW 20 MIN: CPT | Performed by: FAMILY MEDICINE

## 2023-01-26 PROCEDURE — 3080F DIAST BP >= 90 MM HG: CPT | Performed by: FAMILY MEDICINE

## 2023-01-26 NOTE — PATIENT INSTRUCTIONS
Assessment:  Anxiety symptoms better with change in work organization, unable to tolerate sertraline  Irritable bowel symptoms improved with dietary modification  Reflux symptoms improved with dietary modification and famotidine  Hypertension-mild diastolic elevation today    Plan:  Continue current medications  Avoid dietary caffeine, salt, starch and sugar and follow program of regular aerobic exercise  Hematology oncology follow-up as recommended by the consultant  Return for follow-up in about 6 weeks or sooner with any problems  Please always arrive at least 15 minutes before your scheduled appointment time

## 2023-01-26 NOTE — PROGRESS NOTES
Danita Client is a 38 y.o. female (: 1980) presenting to address:    Chief Complaint   Patient presents with    Anxiety    GERD    Irritable Bowel Syndrome    Medication Problem     Only took zoloft for 3 days . Due to severe fatigue        Vitals:    23 1502 23 1508   BP: (!) 120/90 (!) 120/90   Pulse: 82    Resp: 16    Temp: 98.2 °F (36.8 °C)    TempSrc: Temporal    SpO2: 98%    Weight: 190 lb (86.2 kg)    Height: 5' 3\" (1.6 m)    PainSc:   0 - No pain    LMP: 2023       Hearing/Vision:   No results found. Learning Assessment:   No flowsheet data found. Depression Screening:     3 most recent PHQ Screens 2023   PHQ Not Done -   Little interest or pleasure in doing things Not at all   Feeling down, depressed, irritable, or hopeless Not at all   Total Score PHQ 2 0     Fall Risk Assessment:     Fall Risk Assessment, last 12 mths 2021   Able to walk? Yes   Fall in past 12 months? 0   Do you feel unsteady? 0   Are you worried about falling 0     Abuse Screening:     Abuse Screening Questionnaire 2021   Do you ever feel afraid of your partner? N   Are you in a relationship with someone who physically or mentally threatens you? N   Is it safe for you to go home? Y     ADL Assessment:   No flowsheet data found. Coordination of Care Questionaire:   1. \"Have you been to the ER, urgent care clinic since your last visit? Hospitalized since your last visit? \" No    2. \"Have you seen or consulted any other health care providers outside of the [address] [place] [address] since your last visit? \" No     3. For patients aged 39-70: Has the patient had a colonoscopy? NA - based on age     If the patient is female:    4. For patients aged 41-77: Has the patient had a mammogram within the past 2 years? Yes - no Care Gap present    5. For patients aged 21-65: Has the patient had a pap smear? Yes - no Care Gap present    Advanced Directive:   1. Do you have an Advanced Directive? NO    2. Would you like information on Advanced Directives?  NO

## 2023-03-09 ENCOUNTER — OFFICE VISIT (OUTPATIENT)
Dept: FAMILY MEDICINE CLINIC | Facility: CLINIC | Age: 43
End: 2023-03-09
Payer: MEDICAID

## 2023-03-09 VITALS
BODY MASS INDEX: 33.84 KG/M2 | RESPIRATION RATE: 16 BRPM | HEIGHT: 63 IN | SYSTOLIC BLOOD PRESSURE: 118 MMHG | TEMPERATURE: 98.2 F | WEIGHT: 191 LBS | DIASTOLIC BLOOD PRESSURE: 86 MMHG | HEART RATE: 84 BPM | OXYGEN SATURATION: 100 %

## 2023-03-09 DIAGNOSIS — F41.1 GENERALIZED ANXIETY DISORDER: ICD-10-CM

## 2023-03-09 DIAGNOSIS — I10 ESSENTIAL (PRIMARY) HYPERTENSION: Primary | ICD-10-CM

## 2023-03-09 DIAGNOSIS — C50.919 DUCTAL CARCINOMA OF BREAST, UNSPECIFIED LATERALITY (HCC): ICD-10-CM

## 2023-03-09 PROCEDURE — 3074F SYST BP LT 130 MM HG: CPT | Performed by: FAMILY MEDICINE

## 2023-03-09 PROCEDURE — 99214 OFFICE O/P EST MOD 30 MIN: CPT | Performed by: FAMILY MEDICINE

## 2023-03-09 PROCEDURE — 3079F DIAST BP 80-89 MM HG: CPT | Performed by: FAMILY MEDICINE

## 2023-03-09 RX ORDER — VENLAFAXINE HYDROCHLORIDE 37.5 MG/1
37.5 CAPSULE, EXTENDED RELEASE ORAL DAILY
Qty: 30 CAPSULE | Refills: 1 | Status: SHIPPED | OUTPATIENT
Start: 2023-03-09

## 2023-03-09 RX ORDER — FAMOTIDINE 40 MG/1
40 TABLET, FILM COATED ORAL DAILY
COMMUNITY
Start: 2023-01-04

## 2023-03-09 RX ORDER — HYDROCHLOROTHIAZIDE 12.5 MG/1
12.5 CAPSULE, GELATIN COATED ORAL EVERY MORNING
Qty: 90 CAPSULE | Refills: 1 | Status: SHIPPED | OUTPATIENT
Start: 2023-03-09

## 2023-03-09 SDOH — ECONOMIC STABILITY: FOOD INSECURITY: WITHIN THE PAST 12 MONTHS, THE FOOD YOU BOUGHT JUST DIDN'T LAST AND YOU DIDN'T HAVE MONEY TO GET MORE.: NEVER TRUE

## 2023-03-09 SDOH — ECONOMIC STABILITY: HOUSING INSECURITY
IN THE LAST 12 MONTHS, WAS THERE A TIME WHEN YOU DID NOT HAVE A STEADY PLACE TO SLEEP OR SLEPT IN A SHELTER (INCLUDING NOW)?: NO

## 2023-03-09 SDOH — ECONOMIC STABILITY: INCOME INSECURITY: HOW HARD IS IT FOR YOU TO PAY FOR THE VERY BASICS LIKE FOOD, HOUSING, MEDICAL CARE, AND HEATING?: NOT VERY HARD

## 2023-03-09 SDOH — ECONOMIC STABILITY: FOOD INSECURITY: WITHIN THE PAST 12 MONTHS, YOU WORRIED THAT YOUR FOOD WOULD RUN OUT BEFORE YOU GOT MONEY TO BUY MORE.: NEVER TRUE

## 2023-03-09 ASSESSMENT — ENCOUNTER SYMPTOMS
SHORTNESS OF BREATH: 0
CHEST TIGHTNESS: 0

## 2023-03-09 NOTE — PROGRESS NOTES
HISTORY OF PRESENT ILLNESS  Darnell Alan  is a 38 y.o. y.o. female    She returns for follow-up after her previous encounter regarding anxiety, suboptimal control of hypertension with multiple additional issues including carcinoma of the breast still in the treatment phase but currently off Arimidex because of consideration of plans to attempt to achieve pregnancy and additional life stress associated with plans for beginning a masters program this summer    Assessment:  Anxiety symptoms better with change in work organization, unable to tolerate sertraline  Irritable bowel symptoms improved with dietary modification  Reflux symptoms improved with dietary modification and famotidine  Hypertension-mild diastolic elevation today    Plan:  Continue current medications  Avoid dietary caffeine, salt, starch and sugar and follow program of regular aerobic exercise  Hematology oncology follow-up as recommended by the consultant  Return for follow-up in about 6 weeks or sooner with any problems      Mr#: [id number]      Past Medical History:   Diagnosis Date    Anemia     Breast cancer ([facility] [address]     Stage 1 Right breast - BRCA neg    Microcytic anemia 10/1/2018       Past Surgical History:   Procedure Laterality Date    BIOPSY OF BREAST, INCISIONAL      bilateral        Family History   Problem Relation Age of Onset    No Known Problems Father     No Known Problems Mother        Not on File    Social History     Tobacco Use   Smoking Status Never   Smokeless Tobacco Never       Social History     Substance and Sexual Activity   Alcohol Use No       Immunization History   Administered Date(s) Administered    COVID-19, PFIZER PURPLE top, DILUTE for use, (age 15 y+), 30mcg/0.3mL 04/26/2021, 05/17/2021, 12/23/2021    Influenza, FLUARIX, FLULAVAL, FLUZONE (age 10 mo+) AND AFLURIA, (age 1 y+), PF, 0.5mL 10/07/2019, 11/01/2021       Patient Active Problem List   Diagnosis    Sickle-cell trait ([facility] [address]    [facility]         Current Outpatient Medications:     amLODIPine (NORVASC) 5 MG tablet, Take 5 mg by mouth daily, Disp: , Rfl:     ferrous sulfate (IRON 325) 325 (65 Fe) MG tablet, Take 325 mg by mouth daily, Disp: , Rfl:     tamoxifen (NOLVADEX) 20 MG tablet, Take 1 tablet by mouth daily, Disp: , Rfl:       Review of Systems   Constitutional:  Negative for fever and unexpected weight change.   Respiratory:  Negative for chest tightness and shortness of breath.    Cardiovascular:  Negative for chest pain, palpitations and leg swelling.   Neurological:  Negative for dizziness, speech difficulty, light-headedness and headaches.   Psychiatric/Behavioral:  Negative for confusion. The patient is nervous/anxious.      /86   Pulse 84   Temp 98.2 °F (36.8 °C) (Temporal)   Resp 16   Ht 5' 3\" (1.6 m)   Wt 191 lb (86.6 kg)   LMP 02/15/2023   SpO2 100%   BMI 33.83 kg/m²     Physical Exam  Vitals and nursing note reviewed.   Constitutional:       General: She is not in acute distress.     Appearance: Normal appearance. She is not ill-appearing.   HENT:      Head: Normocephalic.   Eyes:      Extraocular Movements: Extraocular movements intact.   Cardiovascular:      Rate and Rhythm: Normal rate.   Pulmonary:      Effort: Pulmonary effort is normal.   Neurological:      Mental Status: She is alert.   Psychiatric:         Mood and Affect: Mood normal.         Behavior: Behavior normal.        ASSESSMENT and PLAN    1. Essential (primary) hypertension  -     hydroCHLOROthiazide (MICROZIDE) 12.5 MG capsule; Take 1 capsule by mouth every morning, Disp-90 capsule, R-1Normal  2. Generalized anxiety disorder  -     venlafaxine (EFFEXOR XR) 37.5 MG extended release capsule; Take 1 capsule by mouth daily, Disp-30 capsule, R-1Normal  3. Ductal carcinoma of breast, unspecified laterality (HCC)    Current Status:  Blood pressure control improved but diastolic at upper limits  Persistent anxiety symptoms  Persistent high level of  situational stress    Health Maintenance Recommendations:  HMDXD-[id number] immunization with bivalent vaccine  Tdap immunization  Mammogram-current    Plan:  Continue amlodipine 5.0 mg daily  Begin HCTZ 12.5 mg daily in the morning  Begin venlafaxine (Effexor) 37.5 mg daily  Encouraged to follow through with plans to seek counseling  Return for follow-up in 3-4 weeks or sooner with any problems  Schedule lab appointment followed by annual physical exam appointment after 11/4/2023, return sooner with any problems  Please always arrive at least 15 minutes before your scheduled appointment time. Nicole Squires MD    Please Note:  This document has been produced using voice recognition software. Unrecognized errors in transcription may be present.

## 2023-03-09 NOTE — PATIENT INSTRUCTIONS
Current Status:  Normotensive but diastolic at upper limits  Persistent anxiety symptoms    Health Maintenance Recommendations:  COVID-19 immunization with bivalent vaccine  Tdap immunization  Mammogram-current    Plan:  Continue amlodipine 5.0 mg daily  Begin HCTZ 12.5 mg daily in the morning  Begin venlafaxine (Effexor) 37.5 mg daily  Return for follow-up in 3-4 weeks or sooner with any problems

## 2023-03-09 NOTE — PROGRESS NOTES
Bharat Lopez is a 38 y.o. female (: 1980) presenting to address:    Chief Complaint   Patient presents with    Anxiety     Would like to discuss Effexor     Pharyngitis     Comes and goes also has cough. Hypertension     Diastolic has in in the high 80's . Systolic has been ok. Leg Swelling     Both legs noticed swelling in lower legs since starting medication        Vitals:    23 1512   Resp: 16       Coordination of Care Questionaire:   1. \"Have you been to the ER, urgent care clinic since your last visit? Hospitalized since your last visit? \" No    2. \"Have you seen or consulted any other health care providers outside of the [address] [place] [address] since your last visit? \" Yes      3. For patients aged 39-70: Has the patient had a colonoscopy / FIT/ Cologuard? NA - based on age      If the patient is female:    4. For patients aged 41-77: Has the patient had a mammogram within the past 2 years? No      5. For patients aged 21-65: Has the patient had a pap smear? No    Advanced Directive:   1. Do you have an Advanced Directive? No    2. Would you like information on Advanced Directives?  No

## 2023-04-02 ASSESSMENT — ENCOUNTER SYMPTOMS
SHORTNESS OF BREATH: 0
CHEST TIGHTNESS: 0

## 2023-04-03 ENCOUNTER — OFFICE VISIT (OUTPATIENT)
Dept: FAMILY MEDICINE CLINIC | Facility: CLINIC | Age: 43
End: 2023-04-03
Payer: MEDICAID

## 2023-04-03 VITALS
BODY MASS INDEX: 33.49 KG/M2 | OXYGEN SATURATION: 99 % | HEART RATE: 90 BPM | WEIGHT: 189 LBS | HEIGHT: 63 IN | SYSTOLIC BLOOD PRESSURE: 124 MMHG | RESPIRATION RATE: 16 BRPM | DIASTOLIC BLOOD PRESSURE: 90 MMHG

## 2023-04-03 DIAGNOSIS — F41.1 GENERALIZED ANXIETY DISORDER: ICD-10-CM

## 2023-04-03 DIAGNOSIS — D17.22 LIPOMA OF LEFT SHOULDER: ICD-10-CM

## 2023-04-03 DIAGNOSIS — I10 ESSENTIAL (PRIMARY) HYPERTENSION: Primary | ICD-10-CM

## 2023-04-03 DIAGNOSIS — Z85.3 PERSONAL HISTORY OF MALIGNANT NEOPLASM OF BREAST: ICD-10-CM

## 2023-04-03 DIAGNOSIS — G56.02 CARPAL TUNNEL SYNDROME ON LEFT: ICD-10-CM

## 2023-04-03 PROCEDURE — 99214 OFFICE O/P EST MOD 30 MIN: CPT | Performed by: FAMILY MEDICINE

## 2023-04-03 PROCEDURE — 3074F SYST BP LT 130 MM HG: CPT | Performed by: FAMILY MEDICINE

## 2023-04-03 PROCEDURE — 3080F DIAST BP >= 90 MM HG: CPT | Performed by: FAMILY MEDICINE

## 2023-04-03 NOTE — PATIENT INSTRUCTIONS
Current Status:  Hypertension-unchanged  Anxiety symptoms improved on venlafaxine  Oncology [id number] scheduled  Carpal tunnel symptoms left wrist to be addressed  Requesting removal of a lipoma on the left shoulder    Health Maintenance Recommendations:  COVID-19 immunization with bivalent vaccine    Plan:  General surgery referral for removal of the lipoma left shoulder. Obtain and wear a cock-up wrist splint on the left. Wear the splint every night. Continue current medications, take hydrochlorothiazide daily in the morning, call back if unable to tolerate the medication  Return for follow-up in 4-6 weeks, return sooner with any problems  Avoid dietary salt, starch and sugar and is much as possible follow program of regular aerobic exercise. Schedule lab appointment followed by annual physical exam appointment after 11/4/2023, return sooner with any problems  Please always arrive at least 15 minutes before your scheduled appointment time.

## 2023-04-11 ENCOUNTER — TELEPHONE (OUTPATIENT)
Dept: FAMILY MEDICINE CLINIC | Facility: CLINIC | Age: 43
End: 2023-04-11

## 2023-04-18 ASSESSMENT — ENCOUNTER SYMPTOMS
SHORTNESS OF BREATH: 0
CHEST TIGHTNESS: 0

## 2023-04-18 NOTE — PROGRESS NOTES
HISTORY OF PRESENT ILLNESS  Yareli Sethi  is a 38 y.o. y.o. female    Ms. Nancy Parker reports medication concerns. She was seen 2 weeks ago regarding hypertension and anxiety with the encounter resulting in the following assessment and plan:    Current Status:  Hypertension-unchanged-stopped HCTZ because she was taking it at night and it was causing nocturia  Anxiety symptoms improved on venlafaxine  Oncology [id number] scheduled  Carpal tunnel symptoms left wrist to be addressed  Requesting removal of a lipoma on the left shoulder     Health Maintenance Recommendations:  COVID-19 immunization with bivalent vaccine     Plan:  General surgery referral for removal of the lipoma left shoulder. Obtain and wear a cock-up wrist splint on the left. Wear the splint every night. Continue current medications, take hydrochlorothiazide daily in the morning, call back if unable to tolerate the medication  Return for follow-up in 4-6 weeks, return sooner with any problems  Avoid dietary salt, starch and sugar and is much as possible follow program of regular aerobic exercise. Today she reports that she and her  have begun attempting to achieve pregnancy and she is aware that her antihypertensive medication may need to be changed. She notes that she was rushing and did not take any antihypertensive medication today. She has been treated with amlodipine 5 mg daily and hydrochlorothiazide 12.5 mg daily.       Mr#: [id number]      Past Medical History:   Diagnosis Date    Anemia     Breast cancer ([facility] [address]     Stage 1 Right breast - BRCA neg    Microcytic anemia 10/1/2018       Past Surgical History:   Procedure Laterality Date    BIOPSY OF BREAST, INCISIONAL      bilateral        Family History   Problem Relation Age of Onset    No Known Problems Father     No Known Problems Mother        No Known Allergies    Social History     Tobacco Use   Smoking Status Never   Smokeless Tobacco Never       Social History

## 2023-04-19 ENCOUNTER — OFFICE VISIT (OUTPATIENT)
Dept: FAMILY MEDICINE CLINIC | Facility: CLINIC | Age: 43
End: 2023-04-19
Payer: MEDICAID

## 2023-04-19 VITALS
SYSTOLIC BLOOD PRESSURE: 130 MMHG | OXYGEN SATURATION: 100 % | HEART RATE: 99 BPM | WEIGHT: 190 LBS | DIASTOLIC BLOOD PRESSURE: 90 MMHG | BODY MASS INDEX: 33.66 KG/M2 | TEMPERATURE: 98.2 F

## 2023-04-19 DIAGNOSIS — F41.1 GENERALIZED ANXIETY DISORDER: ICD-10-CM

## 2023-04-19 DIAGNOSIS — I10 ESSENTIAL (PRIMARY) HYPERTENSION: Primary | ICD-10-CM

## 2023-04-19 PROCEDURE — 3080F DIAST BP >= 90 MM HG: CPT | Performed by: FAMILY MEDICINE

## 2023-04-19 PROCEDURE — 99213 OFFICE O/P EST LOW 20 MIN: CPT | Performed by: FAMILY MEDICINE

## 2023-04-19 PROCEDURE — 3075F SYST BP GE 130 - 139MM HG: CPT | Performed by: FAMILY MEDICINE

## 2023-04-19 RX ORDER — LABETALOL 100 MG/1
100 TABLET, FILM COATED ORAL 2 TIMES DAILY
Qty: 60 TABLET | Refills: 1 | Status: SHIPPED | OUTPATIENT
Start: 2023-04-19

## 2023-04-19 NOTE — PROGRESS NOTES
Jaime Toscano is a 38 y.o. female (: 1980) presenting to address:    Chief Complaint   Patient presents with    Discuss Medications     Norvasc     Knee Pain     Behind right knee x 1 week        Vitals:    23 0746   BP: (!) 130/90   Pulse:    Temp:    SpO2:        Coordination of Care Questionaire:   1. \"Have you been to the ER, urgent care clinic since your last visit? Hospitalized since your last visit? \" No    2. \"Have you seen or consulted any other health care providers outside of the [address] [place] [address] since your last visit? \" No     3. For patients aged 39-70: Has the patient had a colonoscopy / FIT/ Cologuard? NA - based on age      If the patient is female:    4. For patients aged 41-77: Has the patient had a mammogram within the past 2 years? NA - based on age or sex      11. For patients aged 21-65: Has the patient had a pap smear? Yes - no Care Gap present    Advanced Directive:   1. Do you have an Advanced Directive? No    2. Would you like information on Advanced Directives?  No

## 2023-04-19 NOTE — PATIENT INSTRUCTIONS
Current Status:  Hypertension unimproved  Anxiety symptoms stable    Health Maintenance Recommendations:  COVID-19 immunization with bivalent vaccine  Tdap immunization-reports having [facility] a \"tet[facility]\" at [facility] not sure if this was a Td or a Tdap  Mammogram current-copy of report submitted to health maintenance    Plan:  Discontinue amlodipine and hydrochlorothiazide  Begin labetalol 100 mg twice daily  Return for follow-up in 3-4 weeks or sooner with any problems  Schedule lab appointment followed by annual physical exam appointment after 11/4/2023, return sooner with any problems  Please always arrive at least 15 minutes before your scheduled appointment time.

## 2023-05-09 ENCOUNTER — TELEPHONE (OUTPATIENT)
Dept: FAMILY MEDICINE CLINIC | Facility: CLINIC | Age: 43
End: 2023-05-09

## 2023-05-09 NOTE — TELEPHONE ENCOUNTER
----- Message from Terra Other sent at 5/8/2023  2:27 PM EDT -----  Subject: Refill Request    QUESTIONS  Name of Medication? labetalol (NORMODYNE) 100 MG tablet  Patient-reported dosage and instructions? twice a day   How many days do you have left? 4  Preferred Pharmacy? [facility] phone number (if available)? [phone]  Additional Information for Provider? Patient needs this script called in   she did get some from pharmacy to holdher over need new script called in. Had to cx appt today due to having an MRI done.   ---------------------------------------------------------------------------  --------------  CALL BACK INFO  What is the best way for the office to contact you? OK to leave message on   voicemail  Preferred Call Back Phone Number? [phone]  ---------------------------------------------------------------------------  --------------  SCRIPT ANSWERS  Relationship to Patient?  Self

## 2023-05-09 NOTE — TELEPHONE ENCOUNTER
The prescription for labetalol has a refill for an additional 30 days.  I would suggest she obtain the refill and reschedule her blood pressure follow-up appointment within that timeframe.

## 2023-05-09 NOTE — TELEPHONE ENCOUNTER
Last refilled for 60 with 1 refill.  Last oV 4/19/23   Future Appointments   Date Time Provider Jocelyn Jo   11/6/2023  3:30 PM MD GREGG Solorzano BS [facility]B

## 2023-05-11 DIAGNOSIS — I10 ESSENTIAL (PRIMARY) HYPERTENSION: ICD-10-CM

## 2023-05-11 RX ORDER — LABETALOL 100 MG/1
TABLET, FILM COATED ORAL
Qty: 60 TABLET | Refills: 1 | OUTPATIENT
Start: 2023-05-11

## 2023-07-05 ASSESSMENT — ENCOUNTER SYMPTOMS
CHEST TIGHTNESS: 0
SHORTNESS OF BREATH: 0

## 2023-07-05 NOTE — PROGRESS NOTES
HISTORY OF PRESENT ILLNESS  Nilda Baker  is a 41 y.o. y.o. female    She returns for follow-up of hypertension after evaluation 10 weeks ago at which time she reported that she had begun attempting to achieve pregnancy. She was advised to discontinue amlodipine and hydrochlorothiazide  Begin labetalol 100 mg twice daily  Return for follow-up in 3-4 weeks or sooner with any problems    Today she reports no difficulty tolerating labetalol.   She has decided to stay home for now and defer plans for work outside the home and additional education which has improved her symptoms of anxiety  She reports that the excision of the lipoma from her posterior left shoulder went well  Mr#: [id number]      Past Medical History:   Diagnosis Date    Anemia     Breast cancer ([address])     Stage 1 Right breast - BRCA neg    Microcytic anemia 10/1/2018       Past Surgical History:   Procedure Laterality Date    BIOPSY OF BREAST, INCISIONAL      bilateral        Family History   Problem Relation Age of Onset    No Known Problems Father     No Known Problems Mother        No Known Allergies    Social History     Tobacco Use   Smoking Status Never   Smokeless Tobacco Never       Social History     Substance and Sexual Activity   Alcohol Use No       Immunization History   Administered Date(s) Administered    COVID-19, PFIZER PURPLE top, DILUTE for use, (age 15 y+), 30mcg/0.3mL 04/26/2021, 05/17/2021, 12/23/2021    Influenza Virus Vaccine 09/19/2022    Influenza, FLUARIX, FLULAVAL, Lieutenant Bonier (age 10 mo+) AND AFLURIA, (age 1 y+), PF, 0.5mL 10/07/2019, 11/01/2021       Patient Active Problem List   Diagnosis    Sickle-cell trait ([address])    Ductal carcinoma of breast ([address])    Essential (primary) hypertension    Generalized anxiety disorder         Current Outpatient Medications:     labetalol (NORMODYNE) 100 MG tablet, TAKE 1 TABLET BY MOUTH TWICE A DAY, Disp: 60 tablet, Rfl: 0    venlafaxine (EFFEXOR XR) 37.5 MG extended release capsule, Take 1

## 2023-07-06 ENCOUNTER — OFFICE VISIT (OUTPATIENT)
Dept: FAMILY MEDICINE CLINIC | Facility: CLINIC | Age: 43
End: 2023-07-06
Payer: MEDICAID

## 2023-07-06 VITALS
TEMPERATURE: 97.3 F | WEIGHT: 197 LBS | RESPIRATION RATE: 16 BRPM | BODY MASS INDEX: 34.91 KG/M2 | DIASTOLIC BLOOD PRESSURE: 80 MMHG | SYSTOLIC BLOOD PRESSURE: 120 MMHG | OXYGEN SATURATION: 98 % | HEIGHT: 63 IN | HEART RATE: 85 BPM

## 2023-07-06 DIAGNOSIS — F41.1 GENERALIZED ANXIETY DISORDER: ICD-10-CM

## 2023-07-06 DIAGNOSIS — Z85.3 PERSONAL HISTORY OF MALIGNANT NEOPLASM OF BREAST: ICD-10-CM

## 2023-07-06 DIAGNOSIS — I10 ESSENTIAL (PRIMARY) HYPERTENSION: Primary | ICD-10-CM

## 2023-07-06 PROCEDURE — 3079F DIAST BP 80-89 MM HG: CPT | Performed by: FAMILY MEDICINE

## 2023-07-06 PROCEDURE — 3074F SYST BP LT 130 MM HG: CPT | Performed by: FAMILY MEDICINE

## 2023-07-06 PROCEDURE — 99213 OFFICE O/P EST LOW 20 MIN: CPT | Performed by: FAMILY MEDICINE

## 2023-07-06 RX ORDER — LABETALOL 100 MG/1
100 TABLET, FILM COATED ORAL 2 TIMES DAILY
Qty: 180 TABLET | Refills: 3 | Status: SHIPPED | OUTPATIENT
Start: 2023-07-06

## 2023-07-06 NOTE — PATIENT INSTRUCTIONS
Current Status:  Hypertension well controlled  Anxiety symptoms improved    Health Maintenance Recommendations:  COVID-19 immunization with bivalent vaccine  Verify varicella immunization status    Plan:  Schedule lab appointment prior to already scheduled physical exam appointment on 11/6/2023, return sooner with any problems  Please always arrive at least 15 minutes before your scheduled appointment time.

## 2023-07-17 ENCOUNTER — OFFICE VISIT (OUTPATIENT)
Dept: FAMILY MEDICINE CLINIC | Facility: CLINIC | Age: 43
End: 2023-07-17
Payer: MEDICAID

## 2023-07-17 VITALS
OXYGEN SATURATION: 99 % | WEIGHT: 199.8 LBS | HEIGHT: 63 IN | TEMPERATURE: 98.2 F | BODY MASS INDEX: 35.4 KG/M2 | RESPIRATION RATE: 14 BRPM | SYSTOLIC BLOOD PRESSURE: 122 MMHG | HEART RATE: 86 BPM | DIASTOLIC BLOOD PRESSURE: 80 MMHG

## 2023-07-17 DIAGNOSIS — R82.998 DARK URINE: Primary | ICD-10-CM

## 2023-07-17 DIAGNOSIS — R10.9 RIGHT FLANK PAIN: ICD-10-CM

## 2023-07-17 DIAGNOSIS — R82.90 ABNORMAL URINE FINDING: ICD-10-CM

## 2023-07-17 LAB
BILIRUBIN, URINE, POC: NEGATIVE
BLOOD URINE, POC: ABNORMAL
GLUCOSE URINE, POC: NEGATIVE
KETONES, URINE, POC: NEGATIVE
LEUKOCYTE ESTERASE, URINE, POC: NEGATIVE
NITRITE, URINE, POC: NEGATIVE
PH, URINE, POC: 7 (ref 4.6–8)
PROTEIN,URINE, POC: ABNORMAL
SPECIFIC GRAVITY, URINE, POC: 1.02 (ref 1–1.03)
URINALYSIS CLARITY, POC: CLEAR
URINALYSIS COLOR, POC: YELLOW
UROBILINOGEN, POC: ABNORMAL

## 2023-07-17 PROCEDURE — 99213 OFFICE O/P EST LOW 20 MIN: CPT | Performed by: LEGAL MEDICINE

## 2023-07-17 PROCEDURE — 3074F SYST BP LT 130 MM HG: CPT | Performed by: LEGAL MEDICINE

## 2023-07-17 PROCEDURE — 3079F DIAST BP 80-89 MM HG: CPT | Performed by: LEGAL MEDICINE

## 2023-07-17 PROCEDURE — 81003 URINALYSIS AUTO W/O SCOPE: CPT | Performed by: LEGAL MEDICINE

## 2023-07-17 ASSESSMENT — ENCOUNTER SYMPTOMS
VOMITING: 0
NAUSEA: 0
EYE REDNESS: 0
SORE THROAT: 0
FACIAL SWELLING: 0
APNEA: 0
DIARRHEA: 0
BACK PAIN: 1
ANAL BLEEDING: 0
SHORTNESS OF BREATH: 0
EYE ITCHING: 0
CHEST TIGHTNESS: 0
CONSTIPATION: 0
CHOKING: 0
EYE DISCHARGE: 0
EYE PAIN: 0
ABDOMINAL PAIN: 0
WHEEZING: 0
COUGH: 0
BLOOD IN STOOL: 0

## 2023-07-19 LAB — BACTERIA UR CULT: NORMAL

## 2023-10-27 DIAGNOSIS — D50.9 IRON DEFICIENCY ANEMIA, UNSPECIFIED IRON DEFICIENCY ANEMIA TYPE: ICD-10-CM

## 2023-10-27 DIAGNOSIS — F41.1 GENERALIZED ANXIETY DISORDER: ICD-10-CM

## 2023-10-27 DIAGNOSIS — I10 ESSENTIAL (PRIMARY) HYPERTENSION: Primary | ICD-10-CM

## 2023-11-05 PROBLEM — D50.9 MICROCYTIC HYPOCHROMIC ANEMIA: Status: ACTIVE | Noted: 2023-11-05

## 2023-11-05 ASSESSMENT — ENCOUNTER SYMPTOMS
COUGH: 0
SHORTNESS OF BREATH: 0
DIARRHEA: 0
ABDOMINAL PAIN: 0
VOMITING: 0
BLOOD IN STOOL: 0
WHEEZING: 0
ANAL BLEEDING: 0
CONSTIPATION: 0
SORE THROAT: 0
NAUSEA: 0

## 2023-11-05 NOTE — PROGRESS NOTES
HISTORY OF PRESENT ILLNESS  Veria Ahumada  is a 41 y.o. y.o. female    She presents for health assessment, preventative care and follow-up with a history of hypertension, diagnosis with invasive ductal carcinoma of the right breast in May 2018 subsequently treated with lumpectomy previously taking tamoxifen which has been discontinued because of her plans to work on becoming pregnant, and also microcytic anemia with a reported history of sickle trait/thalassemia. The is currently treated with venlafaxine for symptoms of anxiety. She has been evaluated by urology for history of microscopic hematuria since 2010 and imaging studies as well as cystoscopy have been negative. She has previously reported a family history of renal failure with 2 family members treated with dialysis. She has discussed this with her mother who indicates that she has been advised that there is no specific diagnosis other than unexplained renal failure.         Mr#: [id number]      Past Medical History:   Diagnosis Date    Anemia     Breast cancer ([address])     Stage 1 Right breast - BRCA neg    Microcytic anemia 10/1/2018       Past Surgical History:   Procedure Laterality Date    BIOPSY OF BREAST, INCISIONAL      bilateral        Family History   Problem Relation Age of Onset    No Known Problems Father     No Known Problems Mother        No Known Allergies    Social History     Tobacco Use   Smoking Status Never   Smokeless Tobacco Never       Social History     Substance and Sexual Activity   Alcohol Use No       Immunization History   Administered Date(s) Administered    COVID-19, PFIZER PURPLE top, DILUTE for use, (age 15 y+), 30mcg/0.3mL 04/26/2021, 05/17/2021, 12/23/2021    Influenza Virus Vaccine 09/19/2022, 10/16/2023    Influenza, FLUARIX, FLULAVAL, FLUZONE (age 10 mo+) AND AFLURIA, (age 1 y+), PF, 0.5mL 10/07/2019, 11/01/2021       Patient Active Problem List   Diagnosis    Sickle-cell trait ([address])    Ductal carcinoma of breast ([address])

## 2023-11-06 ENCOUNTER — OFFICE VISIT (OUTPATIENT)
Dept: FAMILY MEDICINE CLINIC | Facility: CLINIC | Age: 43
End: 2023-11-06
Payer: MEDICAID

## 2023-11-06 VITALS
OXYGEN SATURATION: 98 % | RESPIRATION RATE: 16 BRPM | BODY MASS INDEX: 36.68 KG/M2 | HEIGHT: 63 IN | SYSTOLIC BLOOD PRESSURE: 138 MMHG | WEIGHT: 207 LBS | HEART RATE: 75 BPM | DIASTOLIC BLOOD PRESSURE: 82 MMHG | TEMPERATURE: 98.2 F

## 2023-11-06 DIAGNOSIS — Z00.00 ROUTINE GENERAL MEDICAL EXAMINATION AT A HEALTH CARE FACILITY: Primary | ICD-10-CM

## 2023-11-06 DIAGNOSIS — D57.3 SICKLE-CELL TRAIT (HCC): ICD-10-CM

## 2023-11-06 DIAGNOSIS — C50.911 DUCTAL CARCINOMA OF RIGHT BREAST (HCC): ICD-10-CM

## 2023-11-06 DIAGNOSIS — F41.1 GENERALIZED ANXIETY DISORDER: ICD-10-CM

## 2023-11-06 DIAGNOSIS — D50.9 MICROCYTIC HYPOCHROMIC ANEMIA: ICD-10-CM

## 2023-11-06 DIAGNOSIS — I10 ESSENTIAL (PRIMARY) HYPERTENSION: ICD-10-CM

## 2023-11-06 PROCEDURE — 3075F SYST BP GE 130 - 139MM HG: CPT | Performed by: FAMILY MEDICINE

## 2023-11-06 PROCEDURE — 3079F DIAST BP 80-89 MM HG: CPT | Performed by: FAMILY MEDICINE

## 2023-11-06 PROCEDURE — 99396 PREV VISIT EST AGE 40-64: CPT | Performed by: FAMILY MEDICINE

## 2023-11-06 RX ORDER — FERROUS SULFATE 325(65) MG
325 TABLET ORAL
COMMUNITY

## 2023-11-06 NOTE — PATIENT INSTRUCTIONS
Current Status:  Hypertension adequately controlled  Ductal carcinoma right breast with subsequent lumpectomy chemotherapeutic intervention on hold, followed by oncology  Microcytic hypochromic anemia improved-reported history of sickle cell trait and possibly thalassemia  Anxiety symptoms fairly well managed with current treatment    Health Maintenance Recommendations:  Keep current with COVID-19 immunization guidelines  Hepatitis B immunization series    Plan:  Continue current medications  Avoid dietary salt, starch and sugar and as much as possible follow program of regular aerobic exercise.   Continue follow-up with hematology oncology  Return for annual physical exam and follow-up in 1 year, return sooner with any problems  Please arrive at least 15 minutes prior to your scheduled appointment time

## 2024-01-18 ENCOUNTER — OFFICE VISIT (OUTPATIENT)
Dept: FAMILY MEDICINE CLINIC | Facility: CLINIC | Age: 44
End: 2024-01-18
Payer: MEDICAID

## 2024-01-18 VITALS
DIASTOLIC BLOOD PRESSURE: 90 MMHG | RESPIRATION RATE: 16 BRPM | HEIGHT: 63 IN | WEIGHT: 212 LBS | OXYGEN SATURATION: 99 % | HEART RATE: 80 BPM | SYSTOLIC BLOOD PRESSURE: 130 MMHG | TEMPERATURE: 97.9 F | BODY MASS INDEX: 37.56 KG/M2

## 2024-01-18 DIAGNOSIS — N97.9 INFERTILITY, FEMALE: ICD-10-CM

## 2024-01-18 DIAGNOSIS — F41.1 GENERALIZED ANXIETY DISORDER: Primary | ICD-10-CM

## 2024-01-18 PROCEDURE — 99213 OFFICE O/P EST LOW 20 MIN: CPT | Performed by: FAMILY MEDICINE

## 2024-01-18 PROCEDURE — 3080F DIAST BP >= 90 MM HG: CPT | Performed by: FAMILY MEDICINE

## 2024-01-18 PROCEDURE — 3075F SYST BP GE 130 - 139MM HG: CPT | Performed by: FAMILY MEDICINE

## 2024-01-18 ASSESSMENT — PATIENT HEALTH QUESTIONNAIRE - PHQ9
SUM OF ALL RESPONSES TO PHQ QUESTIONS 1-9: 0
2. FEELING DOWN, DEPRESSED OR HOPELESS: 0
1. LITTLE INTEREST OR PLEASURE IN DOING THINGS: 0
SUM OF ALL RESPONSES TO PHQ QUESTIONS 1-9: 0
SUM OF ALL RESPONSES TO PHQ9 QUESTIONS 1 & 2: 0

## 2024-01-18 NOTE — PROGRESS NOTES
HISTORY OF PRESENT ILLNESS  Viviana Oscar  is a 43 y.o. y.o. female    Ms. Oscar has been hoping to achieve pregnancy for the past year and has not yet been successful.  She notes that her menstrual flow was a few days late and she did some home urine pregnancy test which she thought might be faintly positive.  Subsequent to that normal menstrual flow started.  She is here today wondering if she should have serum hCG levels.        Mr#: [id number]      Past Medical History:   Diagnosis Date    Anemia     Anxiety 2023    Breast cancer (HCC)     Stage 1 Right breast - BRCA neg    Hypertension     Irritable bowel syndrome 3/2023    Microcytic anemia 10/1/2018    Obesity        Past Surgical History:   Procedure Laterality Date    BIOPSY OF BREAST, INCISIONAL      bilateral        Family History   Problem Relation Age of Onset    High Blood Pressure Father     Depression Mother     Kidney Disease Mother     Mental Illness Sister     Obesity Sister     Kidney Disease Maternal Grandmother     Anemia Sister     Vision Loss Sister        No Known Allergies    Social History     Tobacco Use   Smoking Status Never   Smokeless Tobacco Never   Tobacco Comments    Second hand smoke exposure       Social History     Substance and Sexual Activity   Alcohol Use No       Immunization History   Administered Date(s) Administered    COVID-19, PFIZER PURPLE top, DILUTE for use, (age 12 y+), 30mcg/0.3mL 04/26/2021, 05/17/2021, 12/23/2021    Influenza Virus Vaccine 09/19/2022, 10/16/2023    Influenza, FLUARIX, FLULAVAL, FLUZONE (age 6 mo+) AND AFLURIA, (age 3 y+), PF, 0.5mL 10/07/2019, 11/01/2021       Patient Active Problem List   Diagnosis    Sickle-cell trait (HCC)    Ductal carcinoma of breast (HCC)    Essential (primary) hypertension    Generalized anxiety disorder    Microcytic hypochromic anemia         Current Outpatient Medications:     Prenat MV-Min w/Fe-Folate-DHA (PRENATAL COMPLETE PO), Take by mouth, Disp: , Rfl:     ferrous

## 2024-01-18 NOTE — PROGRESS NOTES
Viviana Oscar is a 43 y.o. female (: 1980) presenting to address:    Chief Complaint   Patient presents with    Pregnancy Test     Had two positive pregnancy tests . Cycle was late by 4 to 5 days but then came wanted to get blood pregnancy test .        Vitals:    24 1500   BP: (!) 130/90   Pulse:    Resp:    Temp:    SpO2:        Coordination of Care Questionaire:   1. \"Have you been to the ER, urgent care clinic since your last visit?  Hospitalized since your last visit?\" Yes urgent care a month ago     2. \"Have you seen or consulted any other health care providers outside of the [facility] since your last visit?\" No     3. For patients aged 45-75: Has the patient had a colonoscopy / FIT/ Cologuard? NA - based on age      If the patient is female:    4. For patients aged 40-74: Has the patient had a mammogram within the past 2 years? Yes - no Care Gap present      5. For patients aged 21-65: Has the patient had a pap smear? Yes - no Care Gap present    Advanced Directive:   1. Do you have an Advanced Directive? No    2. Would you like information on Advanced Directives? No

## 2024-02-21 NOTE — PROGRESS NOTES
HISTORY OF PRESENT ILLNESS  Viviana Oscar  is a 43 y.o. y.o. female    She presents for follow-up at the [facility] emergency department on 2/19/2024 with right-sided sinus pain and nasal discharge as well as elevated blood pressure.  She was discharged with azithromycin and Flonase nasal spray.    She has a history of hypertension, diagnosis with invasive ductal carcinoma of the right breast in May 2018 subsequently treated with lumpectomy previously taking tamoxifen which has been discontinued because of her plans to work on becoming pregnant, and also microcytic anemia with a reported history of sickle trait/thalassemia.  The is currently treated with venlafaxine for symptoms of anxiety.  She has been evaluated by urology for history of microscopic hematuria since 2010 and imaging studies as well as cystoscopy have been negative. She has previously reported a family history of renal failure with 2 family members treated with dialysis. She has discussed this with her mother who indicates that she has been advised that there is no specific diagnosis other than unexplained renal failure.  She is currently treated with labetalol 100 mg twice daily for hypertension, a medication chosen because of her current plans to attempt to pregnancy.  She is treated for anxiety with venlafaxine SR 37.5 mg daily    Today she seems to indicate that she is most bothered by persistent rhinorrhea and postnasal drainage.  She acknowledges that she has been taking Sudafed which may have been playing a role in her elevated blood pressure readings.    Mr#: [id number]      Past Medical History:   Diagnosis Date    Anemia     Anxiety 2023    Breast cancer (HCC)     Stage 1 Right breast - BRCA neg    Hypertension     Irritable bowel syndrome 3/2023    Microcytic anemia 10/1/2018    Obesity        Past Surgical History:   Procedure Laterality Date    BIOPSY OF BREAST, INCISIONAL      bilateral        Family History   Problem

## 2024-02-22 ENCOUNTER — OFFICE VISIT (OUTPATIENT)
Dept: FAMILY MEDICINE CLINIC | Facility: CLINIC | Age: 44
End: 2024-02-22
Payer: MEDICAID

## 2024-02-22 VITALS
RESPIRATION RATE: 16 BRPM | OXYGEN SATURATION: 97 % | BODY MASS INDEX: 37.21 KG/M2 | SYSTOLIC BLOOD PRESSURE: 128 MMHG | WEIGHT: 210 LBS | HEIGHT: 63 IN | DIASTOLIC BLOOD PRESSURE: 100 MMHG | TEMPERATURE: 98 F | HEART RATE: 88 BPM

## 2024-02-22 DIAGNOSIS — I10 ESSENTIAL (PRIMARY) HYPERTENSION: ICD-10-CM

## 2024-02-22 DIAGNOSIS — J98.8 RESPIRATORY INFECTION: Primary | ICD-10-CM

## 2024-02-22 PROCEDURE — 3074F SYST BP LT 130 MM HG: CPT | Performed by: FAMILY MEDICINE

## 2024-02-22 PROCEDURE — 3080F DIAST BP >= 90 MM HG: CPT | Performed by: FAMILY MEDICINE

## 2024-02-22 PROCEDURE — 99213 OFFICE O/P EST LOW 20 MIN: CPT | Performed by: FAMILY MEDICINE

## 2024-02-22 RX ORDER — FLUTICASONE PROPIONATE 50 MCG
1 SPRAY, SUSPENSION (ML) NASAL DAILY
COMMUNITY
Start: 2024-02-20

## 2024-02-22 RX ORDER — AZITHROMYCIN 250 MG/1
250 TABLET, FILM COATED ORAL DAILY
COMMUNITY
Start: 2024-02-21

## 2024-02-22 NOTE — PATIENT INSTRUCTIONS
Current Status:  Elevated blood pressure likely associated with decongestants  Persistent rhinorrhea, congestion, postnasal drainage    Health Maintenance Recommendations:  Keep current with COVID-19 immunization guidelines  Hepatitis B immunization series    Plan:  Finish azithromycin prescribed in the emergency department  Continue Flonase nasal spray 2 sprays each nostril daily, take antihistamines only do not take decongestants  Monitor blood pressures  Return for follow-up in 2 weeks or sooner with any problems

## 2024-02-22 NOTE — PROGRESS NOTES
Viviana Oscar is a 43 y.o. female (: 1980) presenting to address:    Chief Complaint   Patient presents with    Sinus Problem     Seen at Er for bloody mucus .told  Blood pressure was high .     Nasal Congestion     Post nasal drip.     Numbness     Left worse then right .        Vitals:    24 1456   BP: (!) 128/100   Pulse:    Resp:    Temp:    SpO2:        \"Have you been to the ER, urgent care clinic since your last visit?  Hospitalized since your last visit?\"    Yes er     “Have you seen or consulted any other health care providers outside of [facility] since your last visit?”    NO

## 2024-02-23 ENCOUNTER — TELEPHONE (OUTPATIENT)
Dept: FAMILY MEDICINE CLINIC | Facility: CLINIC | Age: 44
End: 2024-02-23

## 2024-02-23 NOTE — TELEPHONE ENCOUNTER
I spoke with Dr Francis would continue to monitor through the weekend and if it continues to be high then she should make an appointment to follow up . These readings are not high enough to cause a stroke and it hard for him to give her a different bp medication because she is trying to get pregnant . 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 Patient will call to schedule if bp continue to run high

## 2024-02-23 NOTE — TELEPHONE ENCOUNTER
190/92 Pt was seen in office on 2/22/2024. Pt would like advice on what she needs to do. That was pt's BP for today

## 2024-02-23 NOTE — TELEPHONE ENCOUNTER
PT called requesting to speak with Dr Francis or clinical staff in regards to high bp reading. Pt would like a call back.    *Accidentally hung up on pt when trying to transfer call. Called pt back with no answer.

## 2024-02-26 ENCOUNTER — TELEPHONE (OUTPATIENT)
Dept: FAMILY MEDICINE CLINIC | Facility: CLINIC | Age: 44
End: 2024-02-26

## 2024-02-26 DIAGNOSIS — I10 ESSENTIAL (PRIMARY) HYPERTENSION: Primary | ICD-10-CM

## 2024-02-26 RX ORDER — LABETALOL 200 MG/1
200 TABLET, FILM COATED ORAL 2 TIMES DAILY
Qty: 180 TABLET | Refills: 1 | Status: SHIPPED | OUTPATIENT
Start: 2024-02-26

## 2024-02-26 NOTE — TELEPHONE ENCOUNTER
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 I spoke with Dr Francis as long has she isn't feeling unwell she can stay at work . She voiced understanding .

## 2024-02-26 NOTE — TELEPHONE ENCOUNTER
Pt would soraya to know if Dr Francis would be able to adjust the labetalol. BP reading this morning was 156/111 later 158/108. Please contact pt

## 2024-02-26 NOTE — TELEPHONE ENCOUNTER
Please advise Ms. Oscar to increase labetalol to 200 mg twice daily. She can use up left over 100 mg tablets by taking two tablets tgwice daily. A prescription for 200 mg tablets has been sent to her pharmacy. Please schedule a follow up appointment for her in 2-3 weeks

## 2024-03-05 ENCOUNTER — TELEPHONE (OUTPATIENT)
Dept: FAMILY MEDICINE CLINIC | Facility: CLINIC | Age: 44
End: 2024-03-05

## 2024-03-05 ASSESSMENT — ENCOUNTER SYMPTOMS
SHORTNESS OF BREATH: 0
CHEST TIGHTNESS: 0

## 2024-03-05 NOTE — TELEPHONE ENCOUNTER
Pt called stating she finished her abx prescribed to her by the hospital but she continues to have symptoms such as a running nose from one nostril. She believed she had a cerebral spinal fluid leak after researching but after speaking with the clinical staff, pt was advised to go to the ER if she believes that is the case. She will follow up as scheduled.    Future Appointments   Date Time Provider Department Center   3/7/2024  3:00 PM Tom Francis MD [facility]   11/7/2024  3:00 PM Tom Francis MD [facility]

## 2024-03-06 ENCOUNTER — TELEPHONE (OUTPATIENT)
Dept: FAMILY MEDICINE CLINIC | Facility: CLINIC | Age: 44
End: 2024-03-06

## 2024-03-06 ASSESSMENT — ENCOUNTER SYMPTOMS: RHINORRHEA: 1

## 2024-03-06 NOTE — TELEPHONE ENCOUNTER
Pt called wanting to get a head start on a referral for her to see an ENT. Pt stated that she was informed by the ER physician to have a referral placed by her phycician

## 2024-03-07 ENCOUNTER — OFFICE VISIT (OUTPATIENT)
Dept: FAMILY MEDICINE CLINIC | Facility: CLINIC | Age: 44
End: 2024-03-07
Payer: MEDICAID

## 2024-03-07 VITALS
TEMPERATURE: 98 F | DIASTOLIC BLOOD PRESSURE: 72 MMHG | HEART RATE: 86 BPM | BODY MASS INDEX: 35.61 KG/M2 | RESPIRATION RATE: 18 BRPM | OXYGEN SATURATION: 96 % | WEIGHT: 201 LBS | SYSTOLIC BLOOD PRESSURE: 118 MMHG | HEIGHT: 63 IN

## 2024-03-07 DIAGNOSIS — J34.89 RHINORRHEA: ICD-10-CM

## 2024-03-07 DIAGNOSIS — I10 ESSENTIAL (PRIMARY) HYPERTENSION: Primary | ICD-10-CM

## 2024-03-07 PROCEDURE — 3078F DIAST BP <80 MM HG: CPT | Performed by: FAMILY MEDICINE

## 2024-03-07 PROCEDURE — 3074F SYST BP LT 130 MM HG: CPT | Performed by: FAMILY MEDICINE

## 2024-03-07 PROCEDURE — 99213 OFFICE O/P EST LOW 20 MIN: CPT | Performed by: FAMILY MEDICINE

## 2024-03-07 NOTE — PATIENT INSTRUCTIONS
Current Status:  Symptoms suggestive of possible nasal CSF leak, extensive CT imaging remarkable only for right sphenoid cell obstruction and right sphenoid mucosal thickening.  EMS otolaryngology referral is pending for further evaluation  Marked improvement in hypertension and significant success with weight management      Plan:  Please schedule a fasting lab appointment followed by an annual physical exam appointment after 11/6/2024, return sooner with any problems  Please always arrive at least 15 minutes before your scheduled appointment time.

## 2024-03-07 NOTE — PROGRESS NOTES
Viviana Oscar is a 43 y.o. female (: 1980) presenting to address:    Chief Complaint   Patient presents with    Medication Check       Vitals:    24 1446   BP: 118/72   Pulse: 86   Resp: 18   Temp: 98 °F (36.7 °C)   SpO2: 96%       \"Have you been to the ER, urgent care clinic since your last visit?  Hospitalized since your last visit?\"    Yes, URI, referred to ENT  “Have you seen or consulted any other health care providers outside of [facility] since your last visit?”    NO             
needed, Disp: , Rfl:       Review of Systems   Constitutional:  Negative for fever and unexpected weight change.   HENT:  Positive for rhinorrhea.    Respiratory:  Negative for chest tightness and shortness of breath.    Cardiovascular:  Negative for chest pain, palpitations and leg swelling.   Neurological:  Negative for dizziness, speech difficulty, light-headedness and headaches.   Psychiatric/Behavioral:  Negative for confusion.        /72 (Site: Right Upper Arm, Position: Sitting, Cuff Size: Medium Adult)   Pulse 86   Temp 98 °F (36.7 °C) (Temporal)   Resp 18   Ht 1.6 m (5' 3\")   Wt 91.2 kg (201 lb)   LMP 02/13/2024   SpO2 96%   BMI 35.61 kg/m²     Physical Exam  Vitals and nursing note reviewed.   Constitutional:       General: She is not in acute distress.     Appearance: Normal appearance. She is not ill-appearing.   HENT:      Head: Normocephalic.   Eyes:      Extraocular Movements: Extraocular movements intact.   Cardiovascular:      Rate and Rhythm: Normal rate.   Pulmonary:      Effort: Pulmonary effort is normal.   Neurological:      Mental Status: She is alert.   Psychiatric:         Mood and Affect: Mood normal.         Behavior: Behavior normal.          ASSESSMENT and PLAN    1. Essential (primary) hypertension  2. Rhinorrhea    Current Status:  Symptoms suggestive of possible nasal CSF leak, extensive CT imaging remarkable only for right sphenoid cell obstruction and right sphenoid mucosal thickening.  EMS otolaryngology referral is pending for further evaluation  Marked improvement in hypertension and significant success with weight management      Plan:  Please schedule a fasting lab appointment followed by an annual physical exam appointment after 11/6/2024, return sooner with any problems  Please always arrive at least 15 minutes before your scheduled appointment time.      Tom Francis MD    Please Note:  This document has been produced using voice recognition software.

## 2024-04-05 ENCOUNTER — TELEPHONE (OUTPATIENT)
Dept: FAMILY MEDICINE CLINIC | Facility: CLINIC | Age: 44
End: 2024-04-05

## 2024-04-05 NOTE — TELEPHONE ENCOUNTER
Pt is scheduled for an appt to fill out [place] paperwork but wants to be sure it can be approved. She has multiple ailments but wants to know the restrictions on what can actually be signed off on in regard to the time off.

## 2024-04-05 NOTE — TELEPHONE ENCOUNTER
It is not possible to figure out those answers without speaking with her at an appointment and she would definitely need to be present in order to fill out the paperwork.

## 2024-04-05 NOTE — TELEPHONE ENCOUNTER
Informed pt, answers to all questions regarding the FMLA request w/b completed at the appointment; pt will bring form to appt.

## 2024-04-14 NOTE — PROGRESS NOTES
HISTORY OF PRESENT ILLNESS  Viviana Oscar  is a 43 y.o. y.o. female    She presents requesting [facility] coverage of time out of work related to a spinal fluid leak diagnosed by head neck surgery with plans for corrective surgery.  She was advised that this would need to be provided by the head and neck surgeon.        Mr#: [id number]      Past Medical History:   Diagnosis Date    Anemia     Anxiety 2023    Breast cancer (HCC)     Stage 1 Right breast - BRCA neg    Hypertension     Irritable bowel syndrome 3/2023    Microcytic anemia 10/1/2018    Obesity        Past Surgical History:   Procedure Laterality Date    BIOPSY OF BREAST, INCISIONAL      bilateral        Family History   Problem Relation Age of Onset    High Blood Pressure Father     Depression Mother     Kidney Disease Mother     Mental Illness Sister     Obesity Sister     Kidney Disease Maternal Grandmother     Anemia Sister     Vision Loss Sister        No Known Allergies    Social History     Tobacco Use   Smoking Status Never   Smokeless Tobacco Never   Tobacco Comments    Second hand smoke exposure       Social History     Substance and Sexual Activity   Alcohol Use No       Immunization History   Administered Date(s) Administered    COVID-19, PFIZER PURPLE top, DILUTE for use, (age 12 y+), 30mcg/0.3mL 04/26/2021, 05/17/2021, 12/23/2021    Influenza Virus Vaccine 09/19/2022, 10/16/2023    Influenza, FLUARIX, FLULAVAL, FLUZONE (age 6 mo+) AND AFLURIA, (age 3 y+), PF, 0.5mL 10/07/2019, 11/01/2021       Patient Active Problem List   Diagnosis    Sickle-cell trait (HCC)    Ductal carcinoma of breast (HCC)    Essential (primary) hypertension    Generalized anxiety disorder    Microcytic hypochromic anemia         Current Outpatient Medications:     labetalol (NORMODYNE) 200 MG tablet, Take 1 tablet by mouth 2 times daily, Disp: 180 tablet, Rfl: 1    Prenat MV-Min w/Fe-Folate-DHA (PRENATAL COMPLETE PO), Take by mouth, Disp: , Rfl:     ferrous sulfate (IRON

## 2024-04-15 ENCOUNTER — OFFICE VISIT (OUTPATIENT)
Dept: FAMILY MEDICINE CLINIC | Facility: CLINIC | Age: 44
End: 2024-04-15

## 2024-04-15 ENCOUNTER — PATIENT MESSAGE (OUTPATIENT)
Dept: FAMILY MEDICINE CLINIC | Facility: CLINIC | Age: 44
End: 2024-04-15

## 2024-04-15 VITALS
WEIGHT: 198 LBS | TEMPERATURE: 98 F | OXYGEN SATURATION: 98 % | BODY MASS INDEX: 35.08 KG/M2 | HEART RATE: 78 BPM | DIASTOLIC BLOOD PRESSURE: 86 MMHG | HEIGHT: 63 IN | SYSTOLIC BLOOD PRESSURE: 130 MMHG | RESPIRATION RATE: 16 BRPM

## 2024-04-15 DIAGNOSIS — G96.00 CEREBROSPINAL FLUID LEAK: Primary | ICD-10-CM

## 2024-04-15 RX ORDER — VENLAFAXINE 37.5 MG/1
37.5 TABLET ORAL DAILY
COMMUNITY

## 2024-04-15 NOTE — TELEPHONE ENCOUNTER
Pt and her spouse requesting to speak to manager. She expressed hurt and frustration with how she has been treated over the last few months by provider. She stated that provider has made comments \"grouping\" her with others, refusing to submit a referral, and tried to joke about using [employer] for her symptoms. Pt feels it was unprofessional and inappropriate and chooses to go to another PCP outside [facility].

## 2024-04-15 NOTE — PROGRESS NOTES
Viviana Oscar is a 43 y.o. female (: 1980) presenting to address:    No chief complaint on file.      Vitals:    04/15/24 1458   BP: 130/86   Pulse: 78   Resp: 16   Temp: 98 °F (36.7 °C)   SpO2: 98%       \"Have you been to the ER, urgent care clinic since your last visit?  Hospitalized since your last visit?\"    NO    “Have you seen or consulted any other health care providers outside of [facility] since your last visit?”    NO

## 2024-11-04 ENCOUNTER — TELEPHONE (OUTPATIENT)
Dept: FAMILY MEDICINE CLINIC | Facility: CLINIC | Age: 44
End: 2024-11-04

## 2024-11-04 NOTE — TELEPHONE ENCOUNTER
Called pt to cancel appt with Dr Francis. PT confirmed that she no longer is a pt of this practice (see encounter from Apr 15, 2024).